# Patient Record
Sex: MALE | Race: WHITE | Employment: OTHER | URBAN - METROPOLITAN AREA
[De-identification: names, ages, dates, MRNs, and addresses within clinical notes are randomized per-mention and may not be internally consistent; named-entity substitution may affect disease eponyms.]

---

## 2021-02-02 ENCOUNTER — OFFICE VISIT (OUTPATIENT)
Dept: ONCOLOGY | Age: 61
End: 2021-02-02
Payer: MEDICARE

## 2021-02-02 ENCOUNTER — HOSPITAL ENCOUNTER (OUTPATIENT)
Dept: INFUSION THERAPY | Age: 61
Discharge: HOME | End: 2021-02-02
Payer: MEDICARE

## 2021-02-02 VITALS
OXYGEN SATURATION: 96 % | HEIGHT: 69 IN | TEMPERATURE: 98.8 F | BODY MASS INDEX: 31.77 KG/M2 | SYSTOLIC BLOOD PRESSURE: 161 MMHG | DIASTOLIC BLOOD PRESSURE: 95 MMHG | WEIGHT: 214.5 LBS | HEART RATE: 97 BPM

## 2021-02-02 DIAGNOSIS — D75.1 ERYTHROCYTOSIS: Primary | ICD-10-CM

## 2021-02-02 PROCEDURE — G8427 DOCREV CUR MEDS BY ELIG CLIN: HCPCS | Performed by: INTERNAL MEDICINE

## 2021-02-02 PROCEDURE — G8484 FLU IMMUNIZE NO ADMIN: HCPCS | Performed by: INTERNAL MEDICINE

## 2021-02-02 PROCEDURE — 4004F PT TOBACCO SCREEN RCVD TLK: CPT | Performed by: INTERNAL MEDICINE

## 2021-02-02 PROCEDURE — G8417 CALC BMI ABV UP PARAM F/U: HCPCS | Performed by: INTERNAL MEDICINE

## 2021-02-02 PROCEDURE — 99205 OFFICE O/P NEW HI 60 MIN: CPT | Performed by: INTERNAL MEDICINE

## 2021-02-02 PROCEDURE — 3017F COLORECTAL CA SCREEN DOC REV: CPT | Performed by: INTERNAL MEDICINE

## 2021-02-02 PROCEDURE — 99214 OFFICE O/P EST MOD 30 MIN: CPT | Performed by: INTERNAL MEDICINE

## 2021-02-02 RX ORDER — TRAMADOL HYDROCHLORIDE 50 MG/1
50 TABLET ORAL EVERY 6 HOURS PRN
COMMUNITY

## 2021-02-02 RX ORDER — PANTOPRAZOLE SODIUM 40 MG/1
40 TABLET, DELAYED RELEASE ORAL DAILY
COMMUNITY

## 2021-02-02 RX ORDER — LISINOPRIL 20 MG/1
20 TABLET ORAL DAILY
COMMUNITY
End: 2022-05-13 | Stop reason: ALTCHOICE

## 2021-02-02 NOTE — PROGRESS NOTES
Problem Relation Age of Onset    No Known Problems Mother     No Known Problems Father        Medications:  Reviewed and reconciled. Social History:  Social History     Socioeconomic History    Marital status: Single     Spouse name: Not on file    Number of children: Not on file    Years of education: Not on file    Highest education level: Not on file   Occupational History    Not on file   Social Needs    Financial resource strain: Not on file    Food insecurity     Worry: Not on file     Inability: Not on file    Transportation needs     Medical: Not on file     Non-medical: Not on file   Tobacco Use    Smoking status: Current Every Day Smoker     Packs/day: 0.50     Years: 40.00     Pack years: 20.00     Types: Cigarettes    Smokeless tobacco: Never Used   Substance and Sexual Activity    Alcohol use:  Yes     Alcohol/week: 6.0 standard drinks     Types: 2 Cans of beer, 2 Shots of liquor, 2 Glasses of wine per week     Comment: weekend    Drug use: Yes     Frequency: 14.0 times per week     Types: Marijuana     Comment: daily    Sexual activity: Not on file   Lifestyle    Physical activity     Days per week: Not on file     Minutes per session: Not on file    Stress: Not on file   Relationships    Social connections     Talks on phone: Not on file     Gets together: Not on file     Attends Buddhism service: Not on file     Active member of club or organization: Not on file     Attends meetings of clubs or organizations: Not on file     Relationship status: Not on file    Intimate partner violence     Fear of current or ex partner: Not on file     Emotionally abused: Not on file     Physically abused: Not on file     Forced sexual activity: Not on file   Other Topics Concern    Not on file   Social History Narrative    Not on file       Allergies:  Not on File    Physical Exam: BP (!) 161/95   Pulse 97   Temp 98.8 °F (37.1 °C)   Ht 5' 9\" (1.753 m)   Wt 214 lb 8 oz (97.3 kg)   SpO2 96%   BMI 31.68 kg/m²   GENERAL: Alert, oriented x 3, not in acute distress. HEENT: PERRLA; EOMI. Oropharynx clear. NECK: Supple. No palpable cervical or supraclavicular lymphadenopathy. LUNGS: Good air entry bilaterally. No wheezing, crackles or rhonchi. CARDIOVASCULAR: Regular rate. No murmurs, rubs or gallops. ABDOMEN: Soft. Non-tender, non-distended. Positive bowel sounds. EXTREMITIES: Without clubbing, cyanosis, or edema. NEUROLOGIC: No focal deficits. ECOG PS 1    Impression/Plan:      The patient is a very pleasant 61-year-old gentleman with a past medical history significant for tobacco use disorder, COPD, hypertension, osteoarthritis and GERD, who has been followed at the [facility] in [facility] for erythrocytosis. The patient had had chronic erythrocytosis, he had a bone marrow biopsy and aspirate done on 7/5/2012, was negative for a primary bone marrow disorder, the erythrocytosis was attributed to lung disease and tobacco abuse, he has been having therapeutic phlebotomies about once a month, for hematocrit greater than 42%. Last phlebotomy was about 2 weeks prior to transferring his care to [facility] ([facility]). 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 I counseled the patient on smoking cessation. We will monitor his CBCD closely, and ordered therapeutic phlebotomies as indicated. The patient will have a CBC done in about 2 to 3 weeks, and possible phlebotomy, await results. RTC in 2 to 3 weeks. Thank you for allowing us to participate in the care of Mrs. Jude Singh.     Beena Guerin MD   HEMATOLOGY/MEDICAL [address] [facility],  [address]  [address] [facility] MED ONCOLOGY  [address] [place] [id number] Dept: [phone]

## 2021-02-02 NOTE — PROGRESS NOTES
Tirso Contreras  1960 64 y.o. Referring Physician: Self referral    PCP: No primary care provider on file. There were no vitals filed for this visit. Wt Readings from Last 3 Encounters:   No data found for Wt        There is no height or weight on file to calculate BMI. Chief Complaint:   Chief Complaint   Patient presents with    New Patient     hematology         Cancer Staging  No matching staging information was found for the patient. Prior Radiation Therapy? NO    Concurrent Chemo/radiation? NO    Prior Chemotherapy? NO    Prior Hormonal Therapy? NO    Head and Neck Cancer? No, patient does NOT have HN cancer.             Current Outpatient Medications:     lisinopril (PRINIVIL;ZESTRIL) 20 MG tablet, Take 20 mg by mouth daily, Disp: , Rfl:     pantoprazole (PROTONIX) 40 MG tablet, Take 40 mg by mouth daily, Disp: , Rfl:     traMADol (ULTRAM) 50 MG tablet, Take 50 mg by mouth every 6 hours as needed for Pain., Disp: , Rfl:        Past Medical History:   Diagnosis Date    GERD (gastroesophageal reflux disease)     Hypertension     Osteoarthritis        Past Surgical History:   Procedure Laterality Date    APPENDECTOMY      TOTAL KNEE ARTHROPLASTY Right 02/01/2018       Family History   Problem Relation Age of Onset    No Known Problems Mother     No Known Problems Father        Social History     Socioeconomic History    Marital status: Single     Spouse name: Not on file    Number of children: Not on file    Years of education: Not on file    Highest education level: Not on file   Occupational History    Not on file   Social Needs    Financial resource strain: Not on file    Food insecurity     Worry: Not on file     Inability: Not on file    Transportation needs     Medical: Not on file     Non-medical: Not on file   Tobacco Use    Smoking status: Current Every Day Smoker     Packs/day: 0.50     Years: 40.00     Pack years: 20.00     Types: Cigarettes  Smokeless tobacco: Never Used   Substance and Sexual Activity    Alcohol use: Yes     Alcohol/week: 6.0 standard drinks     Types: 2 Cans of beer, 2 Shots of liquor, 2 Glasses of wine per week     Comment: weekend    Drug use: Yes     Frequency: 14.0 times per week     Types: Marijuana     Comment: daily    Sexual activity: Not on file   Lifestyle    Physical activity     Days per week: Not on file     Minutes per session: Not on file    Stress: Not on file   Relationships    Social connections     Talks on phone: Not on file     Gets together: Not on file     Attends Rastafari service: Not on file     Active member of club or organization: Not on file     Attends meetings of clubs or organizations: Not on file     Relationship status: Not on file    Intimate partner violence     Fear of current or ex partner: Not on file     Emotionally abused: Not on file     Physically abused: Not on file     Forced sexual activity: Not on file   Other Topics Concern    Not on file   Social History Narrative    Not on file           Occupation: Retired  Retired:  YES: Patient is retired from . REVIEW OF SYSTEMS: <<For Level 5, 10 or more systems>>     Pacemaker/Defibulator/ICD:  No    Mediport: No           FALLS RISK SCREENING ASSESSMENT    Instructions:  Assess the patient and Teller the appropriate indicators that are present for fall risk identification. Total the numbers circled and assign a fall risk score from Table 2.  Reassess patient at a minimum every 12 weeks or with status change. Assessment   Date  2/2/2021     1. Mental Ability: confusion/cognitively impaired No - 0       2. Elimination Issues: incontinence, frequency No - 0       3. Ambulatory: use of assistive devices (walker, cane, off-loading devices), attached to equipment (IV pole, oxygen) No - 0     4. Sensory Limitations: dizziness, vertigo, impaired vision No - 0       5.   Age Less than 72 years - 0 6.  Medication: diuretics, strong analgesics, hypnotics, sedatives, antihypertensive agents   Yes - 3   7. Falls:  recent history of falls within the last 3 months (not to include slipping or tripping)   No - 0   TOTAL 3    If score of 4 or greater was education given? No       TABLE 2   Risk Score Risk Level Plan of Care   0-3 Little or  No Risk 1. Provide assistance as indicated for ambulation activities  2. Reorient confused/cognitively impaired patient  3. Call-light/bell within patient's reach  4. Chair/bed in low position, stretcher/bed with siderails up except when performing patient care activities  5. Educate patient/family/caregiver on falls prevention  6.  Reassess in 12 weeks or with any noted change in patient condition which places them at a risk for a fall   4-6 Moderate Risk 1. Provide assistance as indicated for ambulation activities  2. Reorient confused/cognitively impaired patient  3. Call-light/bell within patient's reach  4. Chair/bed in low position, stretcher/bed with siderails up except when performing patient care activities  5. Educate patient/family/caregiver on falls prevention  6. Falls risk precaution (Yellow sticker Level II) placed on patient chart   7 or   Higher High Risk 1. Place patient in easily observable treatment room  2. Patient attended at all times by family member or staff  3. Provide assistance as indicated for ambulation activities  4. Reorient confused/cognitively impaired patient  5. Call-light/bell within patient's reach  6. Chair/bed in low position, stretcher/bed with siderails up except when performing patient care activities  7. Educate patient/family/caregiver on falls prevention  8.   Falls risk precaution (Yellow sticker Level III) placed on patient chart           MALNUTRITION RISK SCREENING ASSESSMENT Instructions:  Assess the patient and enter the appropriate indicators that are present for nutrition risk identification. Total the numbers entered and assign a risk score. Follow the appropriate action for total score listed below. Assessment   Date  2/2/2021     1. Have you lost weight without trying? 0- No     2. Have you been eating poorly because of a decreased appetite? 0- No   3. Do you have a diagnosis of head and neck cancer?       0- No                                                                                    TOTAL 0        Score of 0-1: No action  Score 2 or greater:  · For Non-Diabetic Patient: Recommend adding Ensure Enlive 2 x daily and provide patient with Ensure wellness bag with coupons  · For Diabetic Patient: Recommend adding Glucerna Shake 2 x daily and provide patient with Glucerna Wellness bag with coupons  · Route to the dietitian via [address]    · Are you having  difficulty performing daily routine tasks  due to fatigue or weakness (ie: bathing/showering, dressing, housework, meal prep, work, child Rebbecca Dashawn): No     · Do you have any arm flexibility/ROM restrictions, swelling or pain that limit activity: No     · Any changes in memory, attention/focus that impact daily activities: No     · Do you avoid participation in leisure/social activity due weakness, fatigue or pain: No     ARE ANY OF THE ABOVE ARE ANSWERED YES: No          PT ASSESSMENT FOR REFERRAL    · Have you had any recent falls in past 2 months: No     · Do you have difficulty  going up/down stairs: No     · Are you having difficulty walking: No     · Do you often hold onto furniture/environmental supports or feel off balance when you are walking: No     · Do you need to take rest breaks when you are walking: No     · Any pain on scale of 1-10 that limits your mobility: No 0/10    ARE ANY OF THE ABOVE ARE ANSWERED YES: No               Ana Winkler

## 2021-02-16 ENCOUNTER — HOSPITAL ENCOUNTER (OUTPATIENT)
Dept: INFUSION THERAPY | Age: 61
End: 2021-02-16

## 2021-02-26 ENCOUNTER — OFFICE VISIT (OUTPATIENT)
Dept: ONCOLOGY | Age: 61
End: 2021-02-26
Payer: MEDICARE

## 2021-02-26 ENCOUNTER — HOSPITAL ENCOUNTER (OUTPATIENT)
Dept: INFUSION THERAPY | Age: 61
Discharge: HOME | End: 2021-02-26
Payer: MEDICARE

## 2021-02-26 VITALS
SYSTOLIC BLOOD PRESSURE: 129 MMHG | WEIGHT: 205.1 LBS | HEIGHT: 70 IN | BODY MASS INDEX: 29.36 KG/M2 | HEART RATE: 86 BPM | DIASTOLIC BLOOD PRESSURE: 84 MMHG | RESPIRATION RATE: 20 BRPM | OXYGEN SATURATION: 99 % | TEMPERATURE: 98.6 F

## 2021-02-26 DIAGNOSIS — D75.1 ERYTHROCYTOSIS: ICD-10-CM

## 2021-02-26 DIAGNOSIS — D75.1 ERYTHROCYTOSIS: Primary | ICD-10-CM

## 2021-02-26 LAB
BASOPHILS ABSOLUTE: 0.07 E9/L (ref 0–0.2)
BASOPHILS RELATIVE PERCENT: 1.5 % (ref 0–2)
EOSINOPHILS ABSOLUTE: 0.16 E9/L (ref 0.05–0.5)
EOSINOPHILS RELATIVE PERCENT: 3.3 % (ref 0–6)
HCT VFR BLD CALC: 45 % (ref 37–54)
HEMOGLOBIN: 14.6 G/DL (ref 12.5–16.5)
IMMATURE GRANULOCYTES #: 0.02 E9/L
IMMATURE GRANULOCYTES %: 0.4 % (ref 0–5)
LYMPHOCYTES ABSOLUTE: 1.48 E9/L (ref 1.5–4)
LYMPHOCYTES RELATIVE PERCENT: 31 % (ref 20–42)
MCH RBC QN AUTO: 26.9 PG (ref 26–35)
MCHC RBC AUTO-ENTMCNC: 32.4 % (ref 32–34.5)
MCV RBC AUTO: 82.9 FL (ref 80–99.9)
MONOCYTES ABSOLUTE: 0.44 E9/L (ref 0.1–0.95)
MONOCYTES RELATIVE PERCENT: 9.2 % (ref 2–12)
NEUTROPHILS ABSOLUTE: 2.61 E9/L (ref 1.8–7.3)
NEUTROPHILS RELATIVE PERCENT: 54.6 % (ref 43–80)
PDW BLD-RTO: 14.7 FL (ref 11.5–15)
PLATELET # BLD: 178 E9/L (ref 130–450)
PMV BLD AUTO: 9.1 FL (ref 7–12)
RBC # BLD: 5.43 E12/L (ref 3.8–5.8)
WBC # BLD: 4.8 E9/L (ref 4.5–11.5)

## 2021-02-26 PROCEDURE — 4004F PT TOBACCO SCREEN RCVD TLK: CPT | Performed by: INTERNAL MEDICINE

## 2021-02-26 PROCEDURE — 81402 MOPATH PROCEDURE LEVEL 3: CPT

## 2021-02-26 PROCEDURE — 82668 ASSAY OF ERYTHROPOIETIN: CPT

## 2021-02-26 PROCEDURE — G8484 FLU IMMUNIZE NO ADMIN: HCPCS | Performed by: INTERNAL MEDICINE

## 2021-02-26 PROCEDURE — G8427 DOCREV CUR MEDS BY ELIG CLIN: HCPCS | Performed by: INTERNAL MEDICINE

## 2021-02-26 PROCEDURE — 85025 COMPLETE CBC W/AUTO DIFF WBC: CPT

## 2021-02-26 PROCEDURE — 3017F COLORECTAL CA SCREEN DOC REV: CPT | Performed by: INTERNAL MEDICINE

## 2021-02-26 PROCEDURE — 99212 OFFICE O/P EST SF 10 MIN: CPT

## 2021-02-26 PROCEDURE — 81219 CALR GENE COM VARIANTS: CPT

## 2021-02-26 PROCEDURE — 99214 OFFICE O/P EST MOD 30 MIN: CPT | Performed by: INTERNAL MEDICINE

## 2021-02-26 PROCEDURE — 81270 JAK2 GENE: CPT

## 2021-02-26 PROCEDURE — G8417 CALC BMI ABV UP PARAM F/U: HCPCS | Performed by: INTERNAL MEDICINE

## 2021-02-26 PROCEDURE — 36415 COLL VENOUS BLD VENIPUNCTURE: CPT

## 2021-02-26 RX ORDER — ASPIRIN 81 MG/1
81 TABLET ORAL DAILY
COMMUNITY

## 2021-02-26 NOTE — PROGRESS NOTES
[address] MED ONCOLOGY  [address] [place] [id number]  Dept: [phone]  Attending progress note      Reason for Visit:   Polycythemia. Referring Physician: Samm Alas MD.    PCP:  No primary care provider on file. History of Present Illness: The patient is a very pleasant 82-year-old gentleman with a past medical history significant for tobacco use disorder, COPD, hypertension, osteoarthritis and GERD, who has been followed at the [facility] in [facility] for erythrocytosis. The patient had had chronic erythrocytosis, he had a bone marrow biopsy and aspirate done on 7/5/2012, was negative for a primary bone marrow disorder, the erythrocytosis was attributed to lung disease and tobacco abuse, he has been having therapeutic phlebotomies about once a month, for hematocrit greater than 42%. Last phlebotomy was about 2 weeks prior to transferring his care to [facility] ([facility] The patient is feeling well in general, no night sweats, unexplained weight loss. Unfortunately continues to smoke cigarettes. Review of Systems;  CONSTITUTIONAL: No fever, chills. Good appetite and energy level. ENMT: Eyes: No diplopia; Nose: No epistaxis. Mouth: No sore throat. RESPIRATORY: No hemoptysis, positive for chronic shortness of breath, cough. CARDIOVASCULAR: No chest pain, palpitations. GASTROINTESTINAL: No nausea/vomiting, abdominal pain, diarrhea/constipation. GENITOURINARY: No dysuria, urinary frequency, hematuria. NEURO: No syncope, presyncope, headache. Remainder:  ROS NEGATIVE    Past Medical History:      Diagnosis Date    GERD (gastroesophageal reflux disease)     Hypertension     Osteoarthritis      There is no problem list on file for this patient.        Past Surgical History:      Procedure Laterality Date    APPENDECTOMY      TOTAL KNEE ARTHROPLASTY Right 02/01/2018       Family History:  Family History   Problem Relation Age of Onset    No Known Problems Mother     No Known Problems Father        Medications:  Reviewed and reconciled. Social History:  Social History     Socioeconomic History    Marital status: Single     Spouse name: Not on file    Number of children: Not on file    Years of education: Not on file    Highest education level: Not on file   Occupational History    Not on file   Social Needs    Financial resource strain: Not on file    Food insecurity     Worry: Not on file     Inability: Not on file    Transportation needs     Medical: Not on file     Non-medical: Not on file   Tobacco Use    Smoking status: Current Every Day Smoker     Packs/day: 0.50     Years: 40.00     Pack years: 20.00     Types: Cigarettes    Smokeless tobacco: Never Used    Tobacco comment: patient states he has slowed down   Substance and Sexual Activity    Alcohol use:  Yes     Alcohol/week: 6.0 standard drinks     Types: 2 Cans of beer, 2 Shots of liquor, 2 Glasses of wine per week     Comment: weekend    Drug use: Yes     Frequency: 14.0 times per week     Types: Marijuana     Comment: daily    Sexual activity: Not on file   Lifestyle    Physical activity     Days per week: Not on file     Minutes per session: Not on file    Stress: Not on file   Relationships    Social connections     Talks on phone: Not on file     Gets together: Not on file     Attends Catholic service: Not on file     Active member of club or organization: Not on file     Attends meetings of clubs or organizations: Not on file     Relationship status: Not on file    Intimate partner violence     Fear of current or ex partner: Not on file     Emotionally abused: Not on file     Physically abused: Not on file     Forced sexual activity: Not on file   Other Topics Concern    Not on file   Social History Narrative    Not on file       Allergies:  No Known Allergies    Physical Exam:  /84 (Site: Left Upper Arm, Position: Sitting, Cuff Size: Medium Adult)   Pulse 86   Temp 98.6 °F (37 °C) (Infrared)   Resp 20   Ht 5' 9.5\" (1.765 m)   Wt 205 lb 1.6 oz (93 kg)   SpO2 99% Comment: room air  BMI 29.85 kg/m²   GENERAL: Alert, oriented x 3, not in acute distress. HEENT: PERRLA; EOMI. Oropharynx clear. NECK: Supple. No palpable cervical or supraclavicular lymphadenopathy. LUNGS: Good air entry bilaterally. No wheezing, crackles or rhonchi. CARDIOVASCULAR: Regular rate. No murmurs, rubs or gallops. ABDOMEN: Soft. Non-tender, non-distended. Positive bowel sounds. EXTREMITIES: Without clubbing, cyanosis, or edema. NEUROLOGIC: No focal deficits. ECOG PS 1    Impression/Plan:      The patient is a very pleasant 45-year-old gentleman with a past medical history significant for tobacco use disorder, COPD, hypertension, osteoarthritis and GERD, who has been followed at the [facility] in [facility] for erythrocytosis. The patient had had chronic erythrocytosis, he had a bone marrow biopsy and aspirate done on 7/5/2012, was negative for a primary bone marrow disorder, the erythrocytosis was attributed to lung disease and tobacco abuse, he has been having therapeutic phlebotomies about once a month, for hematocrit greater than 42%. Last phlebotomy was about 2 weeks prior to transferring his care to [facility] ([facility]). 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 With results. I counseled the patient on smoking cessation. Labs reviewed today, hemoglobin is 14.6, hematocrit 45, MCV 82.9, normal white count and platelet count, no indication for phlebotomy for secondary erythrocytosis. We will monitor his CBCD. RTC in 2 months, with labs, possible phleb. Thank you for allowing us to participate in the care of Mrs. Jude Singh.     Beena Guerin MD   HEMATOLOGY/MEDICAL ONCOLOGY  [facility] [address] MED ONCOLOGY  [address] [facility] [address]  Dept: [phone]

## 2021-02-28 LAB — ERYTHROPOIETIN: 28 MU/ML (ref 4–27)

## 2021-03-15 LAB
Lab: NORMAL
REPORT: NORMAL
THIS TEST SENT TO: NORMAL

## 2021-04-23 ENCOUNTER — OFFICE VISIT (OUTPATIENT)
Dept: ONCOLOGY | Age: 61
End: 2021-04-23
Payer: MEDICARE

## 2021-04-23 ENCOUNTER — HOSPITAL ENCOUNTER (OUTPATIENT)
Dept: INFUSION THERAPY | Age: 61
Discharge: HOME | End: 2021-04-23
Payer: MEDICARE

## 2021-04-23 VITALS
HEIGHT: 69 IN | WEIGHT: 201 LBS | BODY MASS INDEX: 29.77 KG/M2 | HEART RATE: 84 BPM | TEMPERATURE: 96 F | DIASTOLIC BLOOD PRESSURE: 86 MMHG | SYSTOLIC BLOOD PRESSURE: 158 MMHG | OXYGEN SATURATION: 98 %

## 2021-04-23 DIAGNOSIS — D75.1 ERYTHROCYTOSIS: ICD-10-CM

## 2021-04-23 DIAGNOSIS — D75.1 ERYTHROCYTOSIS: Primary | ICD-10-CM

## 2021-04-23 LAB
BASOPHILS ABSOLUTE: 0.06 E9/L (ref 0–0.2)
BASOPHILS RELATIVE PERCENT: 1.2 % (ref 0–2)
EOSINOPHILS ABSOLUTE: 0.16 E9/L (ref 0.05–0.5)
EOSINOPHILS RELATIVE PERCENT: 3.1 % (ref 0–6)
HCT VFR BLD CALC: 47.7 % (ref 37–54)
HEMOGLOBIN: 15 G/DL (ref 12.5–16.5)
IMMATURE GRANULOCYTES #: 0.02 E9/L
IMMATURE GRANULOCYTES %: 0.4 % (ref 0–5)
LYMPHOCYTES ABSOLUTE: 1.39 E9/L (ref 1.5–4)
LYMPHOCYTES RELATIVE PERCENT: 26.8 % (ref 20–42)
MCH RBC QN AUTO: 26.1 PG (ref 26–35)
MCHC RBC AUTO-ENTMCNC: 31.4 % (ref 32–34.5)
MCV RBC AUTO: 83 FL (ref 80–99.9)
MONOCYTES ABSOLUTE: 0.5 E9/L (ref 0.1–0.95)
MONOCYTES RELATIVE PERCENT: 9.7 % (ref 2–12)
NEUTROPHILS ABSOLUTE: 3.05 E9/L (ref 1.8–7.3)
NEUTROPHILS RELATIVE PERCENT: 58.8 % (ref 43–80)
PDW BLD-RTO: 15.2 FL (ref 11.5–15)
PLATELET # BLD: 203 E9/L (ref 130–450)
PMV BLD AUTO: 9.5 FL (ref 7–12)
RBC # BLD: 5.75 E12/L (ref 3.8–5.8)
WBC # BLD: 5.2 E9/L (ref 4.5–11.5)

## 2021-04-23 PROCEDURE — 3017F COLORECTAL CA SCREEN DOC REV: CPT | Performed by: INTERNAL MEDICINE

## 2021-04-23 PROCEDURE — 36415 COLL VENOUS BLD VENIPUNCTURE: CPT

## 2021-04-23 PROCEDURE — G8427 DOCREV CUR MEDS BY ELIG CLIN: HCPCS | Performed by: INTERNAL MEDICINE

## 2021-04-23 PROCEDURE — 99213 OFFICE O/P EST LOW 20 MIN: CPT | Performed by: INTERNAL MEDICINE

## 2021-04-23 PROCEDURE — 99212 OFFICE O/P EST SF 10 MIN: CPT

## 2021-04-23 PROCEDURE — 4004F PT TOBACCO SCREEN RCVD TLK: CPT | Performed by: INTERNAL MEDICINE

## 2021-04-23 PROCEDURE — G8417 CALC BMI ABV UP PARAM F/U: HCPCS | Performed by: INTERNAL MEDICINE

## 2021-04-23 PROCEDURE — 85025 COMPLETE CBC W/AUTO DIFF WBC: CPT

## 2021-04-23 RX ORDER — SILDENAFIL 50 MG/1
50 TABLET, FILM COATED ORAL DAILY
COMMUNITY
Start: 2020-09-25 | End: 2022-05-13 | Stop reason: ALTCHOICE

## 2021-04-23 NOTE — PROGRESS NOTES
[address] MED ONCOLOGY  [address] [facility] [address]  Dept: [phone]  Attending progress note      Reason for Visit:   Polycythemia. Referring Physician: Naina Godinez MD.    PCP:  No primary care provider on file. History of Present Illness: The patient is a very pleasant 51-year-old gentleman with a past medical history significant for tobacco use disorder, COPD, hypertension, osteoarthritis and GERD, who has been [facility] for erythrocytosis. The patient had had chronic erythrocytosis, he had a bone marrow biopsy and aspirate done on 7/5/2012, was negative for a primary bone marrow disorder, the erythrocytosis was attributed to lung disease and tobacco abuse, he has been having therapeutic phlebotomies about once a month, for hematocrit greater than 42%. Last phlebotomy was about 2 weeks prior to transferring his care to [facility] ([facility]). The patient is feeling well in general, no night sweats, unexplained weight loss. Review of Systems;  CONSTITUTIONAL: No fever, chills. Good appetite and energy level. ENMT: Eyes: No diplopia; Nose: No epistaxis. Mouth: No sore throat. RESPIRATORY: No hemoptysis, positive for chronic shortness of breath, cough. CARDIOVASCULAR: No chest pain, palpitations. GASTROINTESTINAL: No nausea/vomiting, abdominal pain, diarrhea/constipation. GENITOURINARY: No dysuria, urinary frequency, hematuria. NEURO: No syncope, presyncope, headache. Remainder:  ROS NEGATIVE    Past Medical History:      Diagnosis Date    GERD (gastroesophageal reflux disease)     Hypertension     Osteoarthritis      There is no problem list on file for this patient.        Past Surgical History:      Procedure Laterality Date    APPENDECTOMY      TOTAL KNEE ARTHROPLASTY Right 02/01/2018       Family History:  Family History   Problem Relation Age of Onset    No Known Problems Mother    [facility] No Known Problems Father        Medications:  Reviewed and reconciled. Social History:  Social History     Socioeconomic History    Marital status: Single     Spouse name: Not on file    Number of children: Not on file    Years of education: Not on file    Highest education level: Not on file   Occupational History    Not on file   Social Needs    Financial resource strain: Not on file    Food insecurity     Worry: Not on file     Inability: Not on file    Transportation needs     Medical: Not on file     Non-medical: Not on file   Tobacco Use    Smoking status: Current Every Day Smoker     Packs/day: 0.50     Years: 40.00     Pack years: 20.00     Types: Cigarettes    Smokeless tobacco: Never Used    Tobacco comment: patient states he has slowed down   Substance and Sexual Activity    Alcohol use:  Yes     Alcohol/week: 6.0 standard drinks     Types: 2 Cans of beer, 2 Shots of liquor, 2 Glasses of wine per week     Comment: weekend    Drug use: Yes     Frequency: 14.0 times per week     Types: Marijuana     Comment: daily    Sexual activity: Not on file   Lifestyle    Physical activity     Days per week: Not on file     Minutes per session: Not on file    Stress: Not on file   Relationships    Social connections     Talks on phone: Not on file     Gets together: Not on file     Attends Episcopalian service: Not on file     Active member of club or organization: Not on file     Attends meetings of clubs or organizations: Not on file     Relationship status: Not on file    Intimate partner violence     Fear of current or ex partner: Not on file     Emotionally abused: Not on file     Physically abused: Not on file     Forced sexual activity: Not on file   Other Topics Concern    Not on file   Social History Narrative    Not on file       Allergies:  No Known Allergies    Physical Exam:  BP (!) 158/86   Pulse 84   Temp 96 °F (35.6 °C)   Ht 5' 9\" (1.753 m)   Wt 201 lb (91.2 kg)   SpO2 98%   BMI 29.68 kg/m²   GENERAL: Alert, oriented x 3, not in acute distress. HEENT: PERRLA; EOMI. Oropharynx clear. NECK: Supple. No palpable cervical or supraclavicular lymphadenopathy. LUNGS: Good air entry bilaterally. No wheezing, crackles or rhonchi. CARDIOVASCULAR: Regular rate. No murmurs, rubs or gallops. ABDOMEN: Soft. Non-tender, non-distended. Positive bowel sounds. EXTREMITIES: Without clubbing, cyanosis, or edema. NEUROLOGIC: No focal deficits. ECOG PS 1    Impression/Plan:      The patient is a very pleasant 53-year-old gentleman with a past medical history significant for tobacco use disorder, COPD, hypertension, osteoarthritis and GERD, who has been followed at the [facility] in [facility] for erythrocytosis. The patient had had chronic erythrocytosis, he had a bone marrow biopsy and aspirate done on 7/5/2012, was negative for a primary bone marrow disorder, the erythrocytosis was attributed to lung disease and tobacco abuse, he has been having therapeutic phlebotomies about once a month, for hematocrit greater than 42%. Last phlebotomy was about 2 weeks prior to transferring his care to [facility] ([facility]). 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 I counseled the patient on smoking cessation. Labs reviewed today, hemoglobin is 15, hematocrit 47.7, normal white count and platelet count, no indication for phlebotomy for secondary erythrocytosis. We will monitor his CBCD. RTC in 3 months, with labs, possible phleb. Thank you for allowing us to participate in the care of Mrs. Christina Molina.     Jaquelin Cardozo MD   HEMATOLOGY/MEDICAL ONCOLOGY  [address] [place] [address] ONCOLOGY  [address] [facility] [address]  Dept: [phone]

## 2021-07-23 ENCOUNTER — OFFICE VISIT (OUTPATIENT)
Dept: ONCOLOGY | Age: 61
End: 2021-07-23
Payer: MEDICARE

## 2021-07-23 ENCOUNTER — HOSPITAL ENCOUNTER (OUTPATIENT)
Dept: INFUSION THERAPY | Age: 61
End: 2021-07-23
Payer: MEDICARE

## 2021-07-23 ENCOUNTER — HOSPITAL ENCOUNTER (OUTPATIENT)
Dept: INFUSION THERAPY | Age: 61
Discharge: HOME | End: 2021-07-23
Payer: MEDICARE

## 2021-07-23 VITALS
OXYGEN SATURATION: 97 % | TEMPERATURE: 97.8 F | SYSTOLIC BLOOD PRESSURE: 162 MMHG | HEIGHT: 69 IN | HEART RATE: 76 BPM | BODY MASS INDEX: 29.18 KG/M2 | WEIGHT: 197 LBS | DIASTOLIC BLOOD PRESSURE: 86 MMHG

## 2021-07-23 DIAGNOSIS — D75.1 ERYTHROCYTOSIS: ICD-10-CM

## 2021-07-23 DIAGNOSIS — D75.1 ERYTHROCYTOSIS: Primary | ICD-10-CM

## 2021-07-23 LAB
BASOPHILS ABSOLUTE: 0.09 E9/L (ref 0–0.2)
BASOPHILS RELATIVE PERCENT: 1.5 % (ref 0–2)
EOSINOPHILS ABSOLUTE: 0.14 E9/L (ref 0.05–0.5)
EOSINOPHILS RELATIVE PERCENT: 2.4 % (ref 0–6)
HCT VFR BLD CALC: 50 % (ref 37–54)
HEMOGLOBIN: 16.4 G/DL (ref 12.5–16.5)
IMMATURE GRANULOCYTES #: 0.03 E9/L
IMMATURE GRANULOCYTES %: 0.5 % (ref 0–5)
LYMPHOCYTES ABSOLUTE: 1.69 E9/L (ref 1.5–4)
LYMPHOCYTES RELATIVE PERCENT: 28.5 % (ref 20–42)
MCH RBC QN AUTO: 28.8 PG (ref 26–35)
MCHC RBC AUTO-ENTMCNC: 32.8 % (ref 32–34.5)
MCV RBC AUTO: 87.7 FL (ref 80–99.9)
MONOCYTES ABSOLUTE: 0.61 E9/L (ref 0.1–0.95)
MONOCYTES RELATIVE PERCENT: 10.3 % (ref 2–12)
NEUTROPHILS ABSOLUTE: 3.37 E9/L (ref 1.8–7.3)
NEUTROPHILS RELATIVE PERCENT: 56.8 % (ref 43–80)
PDW BLD-RTO: 15.7 FL (ref 11.5–15)
PLATELET # BLD: 175 E9/L (ref 130–450)
PMV BLD AUTO: 9.5 FL (ref 7–12)
RBC # BLD: 5.7 E12/L (ref 3.8–5.8)
WBC # BLD: 5.9 E9/L (ref 4.5–11.5)

## 2021-07-23 PROCEDURE — 3017F COLORECTAL CA SCREEN DOC REV: CPT | Performed by: INTERNAL MEDICINE

## 2021-07-23 PROCEDURE — G8427 DOCREV CUR MEDS BY ELIG CLIN: HCPCS | Performed by: INTERNAL MEDICINE

## 2021-07-23 PROCEDURE — 36415 COLL VENOUS BLD VENIPUNCTURE: CPT

## 2021-07-23 PROCEDURE — 99213 OFFICE O/P EST LOW 20 MIN: CPT | Performed by: INTERNAL MEDICINE

## 2021-07-23 PROCEDURE — 99212 OFFICE O/P EST SF 10 MIN: CPT

## 2021-07-23 PROCEDURE — 4004F PT TOBACCO SCREEN RCVD TLK: CPT | Performed by: INTERNAL MEDICINE

## 2021-07-23 PROCEDURE — 85025 COMPLETE CBC W/AUTO DIFF WBC: CPT

## 2021-07-23 PROCEDURE — G8417 CALC BMI ABV UP PARAM F/U: HCPCS | Performed by: INTERNAL MEDICINE

## 2021-07-23 NOTE — PROGRESS NOTES
[address] [facility]OG[facility]  [address] [facility] [address]  Dept: [phone]  Attending progress note      Reason for Visit:   Polycythemia. Referring Physician: Thierno Grimes MD.    PCP:  No primary care provider on file. History of Present Illness: The patient is a very pleasant 24-year-old gentleman with a past medical history significant for tobacco use disorder, COPD, hypertension, osteoarthritis and GERD, who has been followed at the [facility] for erythrocytosis. The patient had had chronic erythrocytosis, he had a bone marrow biopsy and aspirate done on 7/5/2012, was negative for a primary bone marrow disorder, the erythrocytosis was attributed to lung disease and tobacco abuse, he has been having therapeutic phlebotomies about once a month, for hematocrit greater than 42%. Last phlebotomy was about 2 weeks prior to transferring his care to [facility] [place] ([facility]). The patient is feeling well in general, no night sweats, unexplained weight loss. Unfortunately continues to smoke cigarettes, although trying to cut down. Review of Systems;  CONSTITUTIONAL: No fever, chills. Good appetite and energy level. ENMT: Eyes: No diplopia; Nose: No epistaxis. Mouth: No sore throat. RESPIRATORY: No hemoptysis, positive for chronic shortness of breath, cough. CARDIOVASCULAR: No chest pain, palpitations. GASTROINTESTINAL: No nausea/vomiting, abdominal pain, diarrhea/constipation. GENITOURINARY: No dysuria, urinary frequency, hematuria. NEURO: No syncope, presyncope, headache. Remainder:  ROS NEGATIVE    Past Medical History:      Diagnosis Date    GERD (gastroesophageal reflux disease)     Hypertension     Osteoarthritis      There is no problem list on file for this patient.        Past Surgical History:      Procedure Laterality Date    APPENDECTOMY      TOTAL KNEE ARTHROPLASTY Right 02/01/2018       Family History:  Family History   Problem Relation Age of Onset    No Known Problems Mother     No Known Problems Father        Medications:  Reviewed and reconciled. Social History:  Social History     Socioeconomic History    Marital status: Single     Spouse name: Not on file    Number of children: Not on file    Years of education: Not on file    Highest education level: Not on file   Occupational History    Not on file   Tobacco Use    Smoking status: Current Every Day Smoker     Packs/day: 0.50     Years: 40.00     Pack years: 20.00     Types: Cigarettes    Smokeless tobacco: Never Used    Tobacco comment: patient states he has slowed down   Vaping Use    Vaping Use: Never used   Substance and Sexual Activity    Alcohol use: Yes     Alcohol/week: 6.0 standard drinks     Types: 2 Cans of beer, 2 Shots of liquor, 2 Glasses of wine per week     Comment: weekend    Drug use: Yes     Frequency: 14.0 times per week     Types: Marijuana     Comment: daily    Sexual activity: Not on file   Other Topics Concern    Not on file   Social History Narrative    Not on file     Social Determinants of Health     Financial Resource Strain:     Difficulty of Paying Living Expenses:    Food Insecurity:     Worried About Running Out of Food in the Last Year:     Ran Out of Food in the Last Year:    Transportation Needs:     Lack of Transportation (Medical):      Lack of Transportation (Non-Medical):    Physical Activity:     Days of Exercise per Week:     Minutes of Exercise per Session:    Stress:     Feeling of Stress :    Social Connections:     Frequency of Communication with Friends and Family:     Frequency of Social Gatherings with Friends and Family:     Attends Episcopalian Services:     Active Member of Clubs or Organizations:     Attends Club or Organization Meetings:     Marital Status:    Intimate Partner Violence:     Fear of Current or Ex-Partner:     Emotionally Abused:     Physically Abused:     Sexually Abused: Allergies:  No Known Allergies    Physical Exam:  BP (!) 162/86   Pulse 76   Temp 97.8 °F (36.6 °C) (Temporal)   Ht 5' 9\" (1.753 m)   Wt 197 lb (89.4 kg)   SpO2 97%   BMI 29.09 kg/m²   GENERAL: Alert, oriented x 3, not in acute distress. HEENT: PERRLA; EOMI. Oropharynx clear. NECK: Supple. No palpable cervical or supraclavicular lymphadenopathy. LUNGS: Good air entry bilaterally. No wheezing, crackles or rhonchi. CARDIOVASCULAR: Regular rate. No murmurs, rubs or gallops. ABDOMEN: Soft. Non-tender, non-distended. Positive bowel sounds. EXTREMITIES: Without clubbing, cyanosis, or edema. NEUROLOGIC: No focal deficits. ECOG PS 1    Impression/Plan:      The patient is a very pleasant 53-year-old gentleman with a past medical history significant for tobacco use disorder, COPD, hypertension, osteoarthritis and GERD, who has been followed at the [facility] in [address] for erythrocytosis. The patient had had chronic erythrocytosis, he had a bone marrow biopsy and aspirate done on 7/5/2012, was negative for a primary bone marrow disorder, the erythrocytosis was attributed to lung disease and tobacco abuse, he has been having therapeutic phlebotomies about once a month, for hematocrit greater than 42%. Last phlebotomy was about 2 weeks prior to transferring his care [facility] 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 I counseled the patient on smoking cessation. Labs reviewed today, hemoglobin is one 6.4, hematocrit 50, normal white count and platelet count, no indication for phlebotomy for secondary erythrocytosis. We will monitor his CBCD. RTC in 3 months, with labs, possible phleb. Thank you for allowing us to participate in the care of Mrs. Gray Hardwick.     Mendoza Graves MD   HEMATOLOGY/MEDICAL [address] [facility]  [address] [facility] [address]  Dept: [phone]

## 2021-10-22 ENCOUNTER — OFFICE VISIT (OUTPATIENT)
Dept: ONCOLOGY | Age: 61
End: 2021-10-22
Payer: MEDICARE

## 2021-10-22 ENCOUNTER — HOSPITAL ENCOUNTER (OUTPATIENT)
Dept: INFUSION THERAPY | Age: 61
Discharge: HOME | End: 2021-10-22
Payer: MEDICARE

## 2021-10-22 VITALS
BODY MASS INDEX: 30.36 KG/M2 | HEIGHT: 69 IN | TEMPERATURE: 98.1 F | WEIGHT: 205 LBS | RESPIRATION RATE: 18 BRPM | HEART RATE: 81 BPM | OXYGEN SATURATION: 96 % | DIASTOLIC BLOOD PRESSURE: 96 MMHG | SYSTOLIC BLOOD PRESSURE: 124 MMHG

## 2021-10-22 DIAGNOSIS — D75.1 ERYTHROCYTOSIS: Primary | ICD-10-CM

## 2021-10-22 LAB
BASOPHILS ABSOLUTE: 0.09 E9/L (ref 0–0.2)
BASOPHILS RELATIVE PERCENT: 1.4 % (ref 0–2)
EOSINOPHILS ABSOLUTE: 0.18 E9/L (ref 0.05–0.5)
EOSINOPHILS RELATIVE PERCENT: 2.9 % (ref 0–6)
HCT VFR BLD CALC: 49.5 % (ref 37–54)
HEMOGLOBIN: 17 G/DL (ref 12.5–16.5)
IMMATURE GRANULOCYTES #: 0.03 E9/L
IMMATURE GRANULOCYTES %: 0.5 % (ref 0–5)
LYMPHOCYTES ABSOLUTE: 1.63 E9/L (ref 1.5–4)
LYMPHOCYTES RELATIVE PERCENT: 26.1 % (ref 20–42)
MCH RBC QN AUTO: 30 PG (ref 26–35)
MCHC RBC AUTO-ENTMCNC: 34.3 % (ref 32–34.5)
MCV RBC AUTO: 87.5 FL (ref 80–99.9)
MONOCYTES ABSOLUTE: 0.64 E9/L (ref 0.1–0.95)
MONOCYTES RELATIVE PERCENT: 10.3 % (ref 2–12)
NEUTROPHILS ABSOLUTE: 3.67 E9/L (ref 1.8–7.3)
NEUTROPHILS RELATIVE PERCENT: 58.8 % (ref 43–80)
PDW BLD-RTO: 14.3 FL (ref 11.5–15)
PLATELET # BLD: 171 E9/L (ref 130–450)
PMV BLD AUTO: 9.5 FL (ref 7–12)
RBC # BLD: 5.66 E12/L (ref 3.8–5.8)
WBC # BLD: 6.2 E9/L (ref 4.5–11.5)

## 2021-10-22 PROCEDURE — G8427 DOCREV CUR MEDS BY ELIG CLIN: HCPCS | Performed by: INTERNAL MEDICINE

## 2021-10-22 PROCEDURE — G8417 CALC BMI ABV UP PARAM F/U: HCPCS | Performed by: INTERNAL MEDICINE

## 2021-10-22 PROCEDURE — 99212 OFFICE O/P EST SF 10 MIN: CPT

## 2021-10-22 PROCEDURE — 3017F COLORECTAL CA SCREEN DOC REV: CPT | Performed by: INTERNAL MEDICINE

## 2021-10-22 PROCEDURE — 85025 COMPLETE CBC W/AUTO DIFF WBC: CPT

## 2021-10-22 PROCEDURE — 36415 COLL VENOUS BLD VENIPUNCTURE: CPT

## 2021-10-22 PROCEDURE — G8484 FLU IMMUNIZE NO ADMIN: HCPCS | Performed by: INTERNAL MEDICINE

## 2021-10-22 PROCEDURE — 99213 OFFICE O/P EST LOW 20 MIN: CPT | Performed by: INTERNAL MEDICINE

## 2021-10-22 PROCEDURE — 4004F PT TOBACCO SCREEN RCVD TLK: CPT | Performed by: INTERNAL MEDICINE

## 2021-10-22 RX ORDER — LISINOPRIL 10 MG/1
TABLET ORAL
COMMUNITY
Start: 2021-08-25

## 2021-10-22 NOTE — PROGRESS NOTES
[address] [facility]  [address] [place] [id number]  Dept: [phone]  Attending progress note      Reason for Visit:   Polycythemia. Referring Physician: Luis Skiff, MD.    PCP:  No primary care provider on file. History of Present Illness: The patient is a very pleasant 61-year-old gentleman with a past medical history significant for tobacco use disorder, COPD, hypertension, osteoarthritis and GERD, who has been followed at the [facility] in [facility] for erythrocytosis. The patient had had chronic erythrocytosis, he had a bone marrow biopsy and aspirate done on 7/5/2012, was negative for a primary bone marrow disorder, the erythrocytosis was attributed to lung disease and tobacco abuse, he has been having therapeutic phlebotomies about once a month, for hematocrit greater than 42%. Last phlebotomy was about 2 weeks prior to transferring his care to [facility] ([facility]). The patient is feeling well in general, no night sweats, unexplained weight loss. Unfortunately continues to smoke cigarettes, although trying to cut down. No new problems. Review of Systems;  CONSTITUTIONAL: No fever, chills. Good appetite and energy level. ENMT: Eyes: No diplopia; Nose: No epistaxis. Mouth: No sore throat. RESPIRATORY: No hemoptysis, positive for chronic shortness of breath, cough. CARDIOVASCULAR: No chest pain, palpitations. GASTROINTESTINAL: No nausea/vomiting, abdominal pain, diarrhea/constipation. GENITOURINARY: No dysuria, urinary frequency, hematuria. NEURO: No syncope, presyncope, headache. Remainder:  ROS NEGATIVE    Past Medical History:      Diagnosis Date    GERD (gastroesophageal reflux disease)     Hypertension     Osteoarthritis      There is no problem list on file for this patient.        Past Surgical History:      Procedure Laterality Date    APPENDECTOMY      TOTAL KNEE ARTHROPLASTY Right 02/01/2018 Family History:  Family History   Problem Relation Age of Onset    No Known Problems Mother     No Known Problems Father        Medications:  Reviewed and reconciled. Social History:  Social History     Socioeconomic History    Marital status: Single     Spouse name: Not on file    Number of children: Not on file    Years of education: Not on file    Highest education level: Not on file   Occupational History    Not on file   Tobacco Use    Smoking status: Current Every Day Smoker     Packs/day: 0.50     Years: 40.00     Pack years: 20.00     Types: Cigarettes    Smokeless tobacco: Never Used    Tobacco comment: patient states he has slowed down   Vaping Use    Vaping Use: Never used   Substance and Sexual Activity    Alcohol use: Yes     Alcohol/week: 6.0 standard drinks     Types: 2 Cans of beer, 2 Shots of liquor, 2 Glasses of wine per week     Comment: weekend    Drug use: Yes     Frequency: 14.0 times per week     Types: Marijuana     Comment: daily    Sexual activity: Not on file   Other Topics Concern    Not on file   Social History Narrative    Not on file     Social Determinants of Health     Financial Resource Strain:     Difficulty of Paying Living Expenses:    Food Insecurity:     Worried About Running Out of Food in the Last Year:     Ran Out of Food in the Last Year:    Transportation Needs:     Lack of Transportation (Medical):      Lack of Transportation (Non-Medical):    Physical Activity:     Days of Exercise per Week:     Minutes of Exercise per Session:    Stress:     Feeling of Stress :    Social Connections:     Frequency of Communication with Friends and Family:     Frequency of Social Gatherings with Friends and Family:     Attends Hinduism Services:     Active Member of Clubs or Organizations:     Attends Club or Organization Meetings:     Marital Status:    Intimate Partner Violence:     Fear of Current or Ex-Partner:     Emotionally Abused:     Physically Abused:     Sexually Abused: Allergies:  No Known Allergies    Physical Exam:  BP (!) 124/96   Pulse 81   Temp 98.1 °F (36.7 °C)   Resp 18   Ht 5' 9\" (1.753 m)   Wt 205 lb (93 kg)   SpO2 96%   BMI 30.27 kg/m²   GENERAL: Alert, oriented x 3, not in acute distress. HEENT: PERRLA; EOMI. Oropharynx clear. NECK: Supple. No palpable cervical or supraclavicular lymphadenopathy. LUNGS: Good air entry bilaterally. No wheezing, crackles or rhonchi. CARDIOVASCULAR: Regular rate. No murmurs, rubs or gallops. ABDOMEN: Soft. Non-tender, non-distended. Positive bowel sounds. EXTREMITIES: Without clubbing, cyanosis, or edema. NEUROLOGIC: No focal deficits. ECOG PS 1    Impression/Plan:      The patient is a very pleasant 24-year-old gentleman with a past medical history significant for tobacco use disorder, COPD, hypertension, osteoarthritis and GERD, who has been followed at the [facility] [place], [facility] in [address] for erythrocytosis. The patient had had chronic erythrocytosis, he had a bone marrow biopsy and aspirate done on 7/5/2012, was negative for a primary bone marrow disorder, the erythrocytosis was attributed to lung disease and tobacco abuse, he has been having therapeutic phlebotomies about once a month, for hematocrit greater than 42%. Last phlebotomy was about 2 weeks prior to transferring his care to [facility] ([facility]). 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 I counseled the patient on smoking cessation. Labs reviewed today, hemoglobin is 17 G/DL, hematocrit 49.5, normal white count and platelet count, no indication for phlebotomy for secondary erythrocytosis. We will monitor his CBCD. RTC in 3 months, with labs, possible phleb. Thank you for allowing us to participate in the care of Mrs. Fercho Higuera.     MITCH GALLEGOS, MD   HEMATOLOGY/MEDICAL ONCOLOGY  [address] [facility]  [facility] [address]  Dept: [phone]

## 2022-01-21 ENCOUNTER — OFFICE VISIT (OUTPATIENT)
Dept: ONCOLOGY | Age: 62
End: 2022-01-21
Payer: MEDICARE

## 2022-01-21 ENCOUNTER — HOSPITAL ENCOUNTER (OUTPATIENT)
Dept: INFUSION THERAPY | Age: 62
Discharge: HOME | End: 2022-01-21
Payer: MEDICARE

## 2022-01-21 ENCOUNTER — HOSPITAL ENCOUNTER (OUTPATIENT)
Age: 62
Discharge: HOME | End: 2022-01-21
Payer: MEDICARE

## 2022-01-21 VITALS — HEART RATE: 89 BPM | SYSTOLIC BLOOD PRESSURE: 138 MMHG | DIASTOLIC BLOOD PRESSURE: 88 MMHG | RESPIRATION RATE: 16 BRPM

## 2022-01-21 VITALS
BODY MASS INDEX: 30.57 KG/M2 | HEART RATE: 90 BPM | SYSTOLIC BLOOD PRESSURE: 145 MMHG | TEMPERATURE: 97.7 F | OXYGEN SATURATION: 96 % | DIASTOLIC BLOOD PRESSURE: 86 MMHG | WEIGHT: 201.7 LBS | HEIGHT: 68 IN

## 2022-01-21 DIAGNOSIS — F17.200 SMOKER: ICD-10-CM

## 2022-01-21 DIAGNOSIS — D75.1 ERYTHROCYTOSIS: Primary | ICD-10-CM

## 2022-01-21 LAB
ALBUMIN SERPL-MCNC: 4.7 G/DL (ref 3.5–5.2)
ALP BLD-CCNC: 74 U/L (ref 40–129)
ALT SERPL-CCNC: 32 U/L (ref 0–40)
ANION GAP SERPL CALCULATED.3IONS-SCNC: 17 MMOL/L (ref 7–16)
AST SERPL-CCNC: 21 U/L (ref 0–39)
BASOPHILS ABSOLUTE: 0.07 E9/L (ref 0–0.2)
BASOPHILS ABSOLUTE: 0.08 E9/L (ref 0–0.2)
BASOPHILS RELATIVE PERCENT: 1.1 % (ref 0–2)
BASOPHILS RELATIVE PERCENT: 1.3 % (ref 0–2)
BILIRUB SERPL-MCNC: 0.8 MG/DL (ref 0–1.2)
BUN BLDV-MCNC: 9 MG/DL (ref 6–20)
CALCIUM SERPL-MCNC: 9.8 MG/DL (ref 8.6–10.2)
CHLORIDE BLD-SCNC: 95 MMOL/L (ref 98–107)
CHOLESTEROL, TOTAL: 221 MG/DL (ref 0–199)
CO2: 23 MMOL/L (ref 22–29)
CREAT SERPL-MCNC: 0.8 MG/DL (ref 0.7–1.2)
EOSINOPHILS ABSOLUTE: 0.14 E9/L (ref 0.05–0.5)
EOSINOPHILS ABSOLUTE: 0.16 E9/L (ref 0.05–0.5)
EOSINOPHILS RELATIVE PERCENT: 2 % (ref 0–6)
EOSINOPHILS RELATIVE PERCENT: 3 % (ref 0–6)
GFR AFRICAN AMERICAN: >60
GFR NON-AFRICAN AMERICAN: >60 ML/MIN/1.73
GLUCOSE BLD-MCNC: 206 MG/DL (ref 74–99)
HBA1C MFR BLD: 8.7 % (ref 4–5.6)
HCT VFR BLD CALC: 51.9 % (ref 37–54)
HCT VFR BLD CALC: 52.1 % (ref 37–54)
HDLC SERPL-MCNC: 35 MG/DL
HEMOGLOBIN: 18.2 G/DL (ref 12.5–16.5)
HEMOGLOBIN: 18.2 G/DL (ref 12.5–16.5)
IMMATURE GRANULOCYTES #: 0.03 E9/L
IMMATURE GRANULOCYTES #: 0.03 E9/L
IMMATURE GRANULOCYTES %: 0.4 % (ref 0–5)
IMMATURE GRANULOCYTES %: 0.6 % (ref 0–5)
LDL CHOLESTEROL CALCULATED: 137 MG/DL (ref 0–99)
LYMPHOCYTES ABSOLUTE: 1.75 E9/L (ref 1.5–4)
LYMPHOCYTES ABSOLUTE: 1.88 E9/L (ref 1.5–4)
LYMPHOCYTES RELATIVE PERCENT: 26.5 % (ref 20–42)
LYMPHOCYTES RELATIVE PERCENT: 32.3 % (ref 20–42)
MCH RBC QN AUTO: 32 PG (ref 26–35)
MCH RBC QN AUTO: 32.2 PG (ref 26–35)
MCHC RBC AUTO-ENTMCNC: 34.9 % (ref 32–34.5)
MCHC RBC AUTO-ENTMCNC: 35.1 % (ref 32–34.5)
MCV RBC AUTO: 91.6 FL (ref 80–99.9)
MCV RBC AUTO: 91.7 FL (ref 80–99.9)
MONOCYTES ABSOLUTE: 0.5 E9/L (ref 0.1–0.95)
MONOCYTES ABSOLUTE: 0.6 E9/L (ref 0.1–0.95)
MONOCYTES RELATIVE PERCENT: 8.5 % (ref 2–12)
MONOCYTES RELATIVE PERCENT: 9.2 % (ref 2–12)
NEUTROPHILS ABSOLUTE: 2.9 E9/L (ref 1.8–7.3)
NEUTROPHILS ABSOLUTE: 4.37 E9/L (ref 1.8–7.3)
NEUTROPHILS RELATIVE PERCENT: 53.6 % (ref 43–80)
NEUTROPHILS RELATIVE PERCENT: 61.5 % (ref 43–80)
PDW BLD-RTO: 13.1 FL (ref 11.5–15)
PDW BLD-RTO: 13.2 FL (ref 11.5–15)
PLATELET # BLD: 155 E9/L (ref 130–450)
PLATELET # BLD: 181 E9/L (ref 130–450)
PMV BLD AUTO: 9.1 FL (ref 7–12)
PMV BLD AUTO: 9.3 FL (ref 7–12)
POTASSIUM SERPL-SCNC: 3.9 MMOL/L (ref 3.5–5)
RBC # BLD: 5.66 E12/L (ref 3.8–5.8)
RBC # BLD: 5.69 E12/L (ref 3.8–5.8)
SODIUM BLD-SCNC: 135 MMOL/L (ref 132–146)
TOTAL PROTEIN: 7.9 G/DL (ref 6.4–8.3)
TRIGL SERPL-MCNC: 246 MG/DL (ref 0–149)
VLDLC SERPL CALC-MCNC: 49 MG/DL
WBC # BLD: 5.4 E9/L (ref 4.5–11.5)
WBC # BLD: 7.1 E9/L (ref 4.5–11.5)

## 2022-01-21 PROCEDURE — G8427 DOCREV CUR MEDS BY ELIG CLIN: HCPCS | Performed by: INTERNAL MEDICINE

## 2022-01-21 PROCEDURE — 85025 COMPLETE CBC W/AUTO DIFF WBC: CPT

## 2022-01-21 PROCEDURE — 80053 COMPREHEN METABOLIC PANEL: CPT

## 2022-01-21 PROCEDURE — 36415 COLL VENOUS BLD VENIPUNCTURE: CPT

## 2022-01-21 PROCEDURE — G8484 FLU IMMUNIZE NO ADMIN: HCPCS | Performed by: INTERNAL MEDICINE

## 2022-01-21 PROCEDURE — 3017F COLORECTAL CA SCREEN DOC REV: CPT | Performed by: INTERNAL MEDICINE

## 2022-01-21 PROCEDURE — 83036 HEMOGLOBIN GLYCOSYLATED A1C: CPT

## 2022-01-21 PROCEDURE — G8417 CALC BMI ABV UP PARAM F/U: HCPCS | Performed by: INTERNAL MEDICINE

## 2022-01-21 PROCEDURE — 99195 PHLEBOTOMY: CPT

## 2022-01-21 PROCEDURE — 83721 ASSAY OF BLOOD LIPOPROTEIN: CPT

## 2022-01-21 PROCEDURE — 80061 LIPID PANEL: CPT

## 2022-01-21 PROCEDURE — 99214 OFFICE O/P EST MOD 30 MIN: CPT | Performed by: INTERNAL MEDICINE

## 2022-01-21 PROCEDURE — 4004F PT TOBACCO SCREEN RCVD TLK: CPT | Performed by: INTERNAL MEDICINE

## 2022-01-21 NOTE — PROGRESS NOTES
ARTHROPLASTY Right 02/01/2018       Family History:  Family History   Problem Relation Age of Onset    No Known Problems Mother     No Known Problems Father        Medications:  Reviewed and reconciled. Social History:  Social History     Socioeconomic History    Marital status: Single     Spouse name: Not on file    Number of children: Not on file    Years of education: Not on file    Highest education level: Not on file   Occupational History    Not on file   Tobacco Use    Smoking status: Current Every Day Smoker     Packs/day: 0.50     Years: 40.00     Pack years: 20.00     Types: Cigarettes    Smokeless tobacco: Never Used    Tobacco comment: patient states he has slowed down   Vaping Use    Vaping Use: Never used   Substance and Sexual Activity    Alcohol use: Yes     Alcohol/week: 6.0 standard drinks     Types: 2 Cans of beer, 2 Shots of liquor, 2 Glasses of wine per week     Comment: weekend    Drug use: Yes     Frequency: 14.0 times per week     Types: Marijuana Veldon Bunting)     Comment: daily    Sexual activity: Not on file   Other Topics Concern    Not on file   Social History Narrative    Not on file     Social Determinants of Health     Financial Resource Strain:     Difficulty of Paying Living Expenses: Not on file   Food Insecurity:     Worried About Running Out of Food in the Last Year: Not on file    Ayden of Food in the Last Year: Not on file   Transportation Needs:     Lack of Transportation (Medical): Not on file    Lack of Transportation (Non-Medical):  Not on file   Physical Activity:     Days of Exercise per Week: Not on file    Minutes of Exercise per Session: Not on file   Stress:     Feeling of Stress : Not on file   Social Connections:     Frequency of Communication with Friends and Family: Not on file    Frequency of Social Gatherings with Friends and Family: Not on file    Attends Protestant Services: Not on file    Active Member of Clubs or Organizations: Not on file    Attends Club or Organization Meetings: Not on file    Marital Status: Not on file   Intimate Partner Violence:     Fear of Current or Ex-Partner: Not on file    Emotionally Abused: Not on file    Physically Abused: Not on file    Sexually Abused: Not on file   Housing Stability:     Unable to Pay for Housing in the Last Year: Not on file    Number of [place] in the Last Year: Not on file    Unstable Housing in the Last Year: Not on file       Allergies:  No Known Allergies    Physical Exam:  BP (!) 145/86   Pulse 90   Temp 97.7 °F (36.5 °C)   Ht 5' 8\" (1.727 m)   Wt 201 lb 11.2 oz (91.5 kg)   SpO2 96%   BMI 30.67 kg/m²   GENERAL: Alert, oriented x 3, not in acute distress. HEENT: PERRLA; EOMI. Oropharynx clear. NECK: Supple. No palpable cervical or supraclavicular lymphadenopathy. LUNGS: Good air entry bilaterally. No wheezing, crackles or rhonchi. CARDIOVASCULAR: Regular rate. No murmurs, rubs or gallops. ABDOMEN: Soft. Non-tender, non-distended. Positive bowel sounds. EXTREMITIES: Without clubbing, cyanosis, or edema. NEUROLOGIC: No focal deficits. ECOG PS 1    Impression/Plan:      The patient is a very pleasant 57-year-old gentleman with a past medical history significant for tobacco use disorder, COPD, hypertension, osteoarthritis and GERD, who has been followed at the [facility] in [address] for erythrocytosis. The patient had had chronic erythrocytosis, he had a bone marrow biopsy and aspirate done on 7/5/2012, was negative for a primary bone marrow disorder, the erythrocytosis was attributed to lung disease and tobacco abuse, he has been having therapeutic phlebotomies about once a month, for hematocrit greater than 42%. Last phlebotomy was about 2 weeks prior to transferring his care to [facility]).      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 I counseled the patient on smoking cessation. Low-dose CT lung screen will be ordered. Labs reviewed today, hemoglobin is 18.2 G/DL, hematocrit 52.1, normal white count and platelet count, the patient will have a phlebotomy done today, 350 cc of whole blood will be removed, the patient will avoid vigorous exercise and will keep himself well-hydrated after the phlebotomy. We will monitor his CBCD. RTC in 3 months, with labs, possible phleb. Thank you for allowing us to participate in the care of Mrs. Laura Hampton.     Luis Alfredo Hogan MD   HEMATOLOGY/MEDICAL [address] [address] [address] [facility]COL[facility]  [address] [place] [id number]  Dept: [address]: [phone]

## 2022-01-25 LAB
Lab: NORMAL
REPORT: NORMAL
THIS TEST SENT TO: NORMAL

## 2022-02-28 ENCOUNTER — TELEPHONE (OUTPATIENT)
Dept: CASE MANAGEMENT | Age: 62
End: 2022-02-28

## 2022-02-28 NOTE — TELEPHONE ENCOUNTER
I called the patient and he confirmed his CT lung screening at [address] [place] [address] on 3/1/2022 at 12:30 pm.  I reminded the patient to arrive at 12:00 pm, enter through the main entrance, and register. Patient confirmed.             Electronically signed by Julian Mcguire on 2/28/22 at 3:42 PM EST

## 2022-03-01 ENCOUNTER — HOSPITAL ENCOUNTER (OUTPATIENT)
Dept: CT IMAGING | Age: 62
Discharge: HOME | End: 2022-03-03
Payer: MEDICARE

## 2022-03-01 DIAGNOSIS — F17.200 SMOKER: ICD-10-CM

## 2022-03-01 DIAGNOSIS — D75.1 ERYTHROCYTOSIS: ICD-10-CM

## 2022-03-01 PROCEDURE — 71271 CT THORAX LUNG CANCER SCR C-: CPT

## 2022-03-02 ENCOUNTER — TELEPHONE (OUTPATIENT)
Dept: CASE MANAGEMENT | Age: 62
End: 2022-03-02

## 2022-03-02 NOTE — TELEPHONE ENCOUNTER
No call, encounter opened to process CT Lung Screening. CT Lung Screen: 3/1/2022      Impression   1. There is no pulmonary infiltrate, mass or suspicious pulmonary nodule   2. Emphysematous changes   3. Hepatic steatosis.       LUNG RADS:   Per ACR Lung-RADS Version 1.1       Category 1, Negative (No nodules and definitely benign nodules).       Management: Continue annual lung screening with LDCT in 12 months.       RECOMMENDATIONS:   If you would like to register your patient with the [facility], please contact the Nurse Navigator at   [phone]. Pack years: 20    Social History     Tobacco Use  Smoking Status: Current Every Day Smoker    Start Date:    Quit Date:    Types: Cigarettes   Packs/Day: 0.5   Years: 40   Pack Years: 20   Smokeless Tobacco: Never Used         Results letter sent to patient via my chart or mailed.      [facility]

## 2022-05-11 DIAGNOSIS — D75.1 ERYTHROCYTOSIS: Primary | ICD-10-CM

## 2022-05-13 ENCOUNTER — OFFICE VISIT (OUTPATIENT)
Dept: ONCOLOGY | Age: 62
End: 2022-05-13
Payer: MEDICARE

## 2022-05-13 ENCOUNTER — HOSPITAL ENCOUNTER (OUTPATIENT)
Age: 62
Discharge: HOME | End: 2022-05-15
Payer: MEDICARE

## 2022-05-13 ENCOUNTER — HOSPITAL ENCOUNTER (OUTPATIENT)
Dept: INFUSION THERAPY | Age: 62
Discharge: HOME | End: 2022-05-13
Payer: MEDICARE

## 2022-05-13 ENCOUNTER — HOSPITAL ENCOUNTER (OUTPATIENT)
Dept: GENERAL RADIOLOGY | Age: 62
Discharge: HOME | End: 2022-05-15
Payer: MEDICARE

## 2022-05-13 VITALS
TEMPERATURE: 98.2 F | DIASTOLIC BLOOD PRESSURE: 83 MMHG | SYSTOLIC BLOOD PRESSURE: 150 MMHG | HEART RATE: 78 BPM | RESPIRATION RATE: 18 BRPM

## 2022-05-13 VITALS
OXYGEN SATURATION: 99 % | SYSTOLIC BLOOD PRESSURE: 157 MMHG | TEMPERATURE: 98.7 F | HEIGHT: 69 IN | RESPIRATION RATE: 20 BRPM | DIASTOLIC BLOOD PRESSURE: 87 MMHG | BODY MASS INDEX: 29.09 KG/M2 | HEART RATE: 77 BPM | WEIGHT: 196.4 LBS

## 2022-05-13 DIAGNOSIS — D75.1 ERYTHROCYTOSIS: Primary | ICD-10-CM

## 2022-05-13 DIAGNOSIS — M25.561 RIGHT KNEE PAIN, UNSPECIFIED CHRONICITY: ICD-10-CM

## 2022-05-13 LAB
BASOPHILS ABSOLUTE: 0.07 E9/L (ref 0–0.2)
BASOPHILS RELATIVE PERCENT: 1.2 % (ref 0–2)
EOSINOPHILS ABSOLUTE: 0.11 E9/L (ref 0.05–0.5)
EOSINOPHILS RELATIVE PERCENT: 1.9 % (ref 0–6)
HCT VFR BLD CALC: 53.2 % (ref 37–54)
HEMOGLOBIN: 18.4 G/DL (ref 12.5–16.5)
IMMATURE GRANULOCYTES #: 0.02 E9/L
IMMATURE GRANULOCYTES %: 0.4 % (ref 0–5)
LYMPHOCYTES ABSOLUTE: 1.48 E9/L (ref 1.5–4)
LYMPHOCYTES RELATIVE PERCENT: 26 % (ref 20–42)
MCH RBC QN AUTO: 31.6 PG (ref 26–35)
MCHC RBC AUTO-ENTMCNC: 34.6 % (ref 32–34.5)
MCV RBC AUTO: 91.4 FL (ref 80–99.9)
MONOCYTES ABSOLUTE: 0.63 E9/L (ref 0.1–0.95)
MONOCYTES RELATIVE PERCENT: 11.1 % (ref 2–12)
NEUTROPHILS ABSOLUTE: 3.38 E9/L (ref 1.8–7.3)
NEUTROPHILS RELATIVE PERCENT: 59.4 % (ref 43–80)
PDW BLD-RTO: 13.2 FL (ref 11.5–15)
PLATELET # BLD: 151 E9/L (ref 130–450)
PMV BLD AUTO: 8.8 FL (ref 7–12)
RBC # BLD: 5.82 E12/L (ref 3.8–5.8)
WBC # BLD: 5.7 E9/L (ref 4.5–11.5)

## 2022-05-13 PROCEDURE — 73562 X-RAY EXAM OF KNEE 3: CPT

## 2022-05-13 PROCEDURE — 85025 COMPLETE CBC W/AUTO DIFF WBC: CPT

## 2022-05-13 PROCEDURE — G8417 CALC BMI ABV UP PARAM F/U: HCPCS | Performed by: INTERNAL MEDICINE

## 2022-05-13 PROCEDURE — 99214 OFFICE O/P EST MOD 30 MIN: CPT | Performed by: INTERNAL MEDICINE

## 2022-05-13 PROCEDURE — 2580000003 HC RX 258: Performed by: INTERNAL MEDICINE

## 2022-05-13 PROCEDURE — 36415 COLL VENOUS BLD VENIPUNCTURE: CPT

## 2022-05-13 PROCEDURE — 4004F PT TOBACCO SCREEN RCVD TLK: CPT | Performed by: INTERNAL MEDICINE

## 2022-05-13 PROCEDURE — G8427 DOCREV CUR MEDS BY ELIG CLIN: HCPCS | Performed by: INTERNAL MEDICINE

## 2022-05-13 PROCEDURE — 99195 PHLEBOTOMY: CPT

## 2022-05-13 PROCEDURE — 3017F COLORECTAL CA SCREEN DOC REV: CPT | Performed by: INTERNAL MEDICINE

## 2022-05-13 RX ORDER — 0.9 % SODIUM CHLORIDE 0.9 %
350 INTRAVENOUS SOLUTION INTRAVENOUS ONCE
Status: COMPLETED | OUTPATIENT
Start: 2022-05-13 | End: 2022-05-13

## 2022-05-13 RX ADMIN — SODIUM CHLORIDE 350 ML: 9 INJECTION, SOLUTION INTRAVENOUS at 12:07

## 2022-05-13 NOTE — PROGRESS NOTES
[address]  [address]  MED ONCOLOGY  [facility] [address]  Dept: 71 Tamara Roque: [phone]  Attending progress note      Reason for Visit:   Polycythemia. Referring Physician: Rubio Valerio MD.    PCP:  Connor Roldan    History of Present Illness: The patient is a very pleasant 81-year-old gentleman with a past medical history significant for tobacco use disorder, COPD, hypertension, osteoarthritis and GERD, who has been followed at the [facility] in [facility] for erythrocytosis. The patient had had chronic erythrocytosis, he had a bone marrow biopsy and aspirate done on 7/5/2012, was negative for a primary bone marrow disorder, the erythrocytosis was attributed to lung disease and tobacco abuse, he has been having therapeutic phlebotomies about once a month, for hematocrit greater than 42%. Last phlebotomy was about 2 weeks prior to transferring his care to [facility] ([facility]). The patient has right knee pain, PCP had ordered an x-ray, he had knee replacement done when he was in [place]. No night sweats, unexplained weight loss. Unfortunately continues to smoke cigarettes. Review of Systems;  CONSTITUTIONAL: No fever, chills. Good appetite and energy level. ENMT: Eyes: No diplopia; Nose: No epistaxis. Mouth: No sore throat. RESPIRATORY: No hemoptysis, positive for chronic shortness of breath, cough. CARDIOVASCULAR: No chest pain, palpitations. GASTROINTESTINAL: No nausea/vomiting, abdominal pain, diarrhea/constipation. GENITOURINARY: No dysuria, urinary frequency, hematuria. NEURO: No syncope, presyncope, sometimes he has headaches.   Remainder:  ROS NEGATIVE    Past Medical History:      Diagnosis Date    GERD (gastroesophageal reflux disease)     Hypertension     Osteoarthritis      Patient Active Problem List   Diagnosis    Erythrocytosis        Past Surgical History:      Procedure Laterality Date    APPENDECTOMY      TOTAL KNEE ARTHROPLASTY Right 02/01/2018       Family History:  Family History   Problem Relation Age of Onset    No Known Problems Mother     No Known Problems Father        Medications:  Reviewed and reconciled. Social History:  Social History     Socioeconomic History    Marital status: Single     Spouse name: Not on file    Number of children: Not on file    Years of education: Not on file    Highest education level: Not on file   Occupational History    Not on file   Tobacco Use    Smoking status: Current Every Day Smoker     Packs/day: 0.50     Years: 40.00     Pack years: 20.00     Types: Cigarettes    Smokeless tobacco: Never Used    Tobacco comment: patient states he has slowed down   Vaping Use    Vaping Use: Never used   Substance and Sexual Activity    Alcohol use: Yes     Alcohol/week: 6.0 standard drinks     Types: 2 Cans of beer, 2 Shots of liquor, 2 Glasses of wine per week     Comment: weekend    Drug use: Yes     Frequency: 14.0 times per week     Types: Marijuana Jerelyn November)     Comment: daily    Sexual activity: Not on file   Other Topics Concern    Not on file   Social History Narrative    Not on file     Social Determinants of Health     Financial Resource Strain:     Difficulty of Paying Living Expenses: Not on file   Food Insecurity:     Worried About Running Out of Food in the Last Year: Not on file    Ayden of Food in the Last Year: Not on file   Transportation Needs:     Lack of Transportation (Medical): Not on file    Lack of Transportation (Non-Medical):  Not on file   Physical Activity:     Days of Exercise per Week: Not on file    Minutes of Exercise per Session: Not on file   Stress:     Feeling of Stress : Not on file   Social Connections:     Frequency of Communication with Friends and Family: Not on file    Frequency of Social Gatherings with Friends and Family: Not on file    Attends Gnosticist Services: Not on file   [employer] of Clubs or Organizations: Not on file    Attends Club or Organization Meetings: Not on file    Marital Status: Not on file   Intimate Partner Violence:     Fear of Current or Ex-Partner: Not on file    Emotionally Abused: Not on file    Physically Abused: Not on file    Sexually Abused: Not on file   Housing Stability:     Unable to Pay for Housing in the Last Year: Not on file    Number of [place] in the Last Year: Not on file    Unstable Housing in the Last Year: Not on file       Allergies:  No Known Allergies    Physical Exam:  BP (!) 157/87 (Site: Right Upper Arm)   Pulse 77   Temp 98.7 °F (37.1 °C) (Temporal)   Resp 20   Ht 5' 9\" (1.753 m)   Wt 196 lb 6.4 oz (89.1 kg)   SpO2 99%   BMI 29.00 kg/m²   GENERAL: Alert, oriented x 3, not in acute distress. HEENT: PERRLA; EOMI. Oropharynx clear. NECK: Supple. No palpable cervical or supraclavicular lymphadenopathy. LUNGS: Good air entry bilaterally. No wheezing, crackles or rhonchi. CARDIOVASCULAR: Regular rate. No murmurs, rubs or gallops. ABDOMEN: Soft. Non-tender, non-distended. Positive bowel sounds. EXTREMITIES: Without clubbing, cyanosis, or edema. NEUROLOGIC: No focal deficits. ECOG PS 1    Impression/Plan:      The patient is a very pleasant 62-year-old gentleman with a past medical history significant for tobacco use disorder, COPD, hypertension, osteoarthritis and GERD, who has been followed at the [facility] in [facility] for erythrocytosis. The patient had had chronic erythrocytosis, he had a bone marrow biopsy and aspirate done on 7/5/2012, was negative for a primary bone marrow disorder, the erythrocytosis was attributed to lung disease and tobacco abuse, he has been having therapeutic phlebotomies about once a month, for hematocrit greater than 42%. Last phlebotomy was about 2 weeks prior to transferring his care to [facility]).      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 I counseled the patient on smoking cessation. Low-dose CT lung screen was done on 3/1/2022, there was no pulmonary infiltrate, mass or suspicious pulmonary nodules, he has emphysematous changes, and hepatic steatosis. Recommended weight loss, exercise and to avoid carbohydrates and fatty food. Labs reviewed today, hemoglobin is 18.4G/DL, hematocrit 53.2, normal white count and platelet count, the patient will have a phlebotomy done today, 350 cc of whole blood will be removed, the patient will avoid vigorous exercise and will keep himself well-hydrated after the phlebotomy. We will monitor his CBCD. RTC in 3 months, with labs, possible phleb. Thank you for allowing us to participate in the care of Mrs. Tere Romo.     Yee Montero MD   HEMATOLOGY/MEDICAL [address] [place] MED ONCOLOGY  [address] [place] [id number]  Dept:  Tamara Roque: [phone]

## 2022-08-12 ENCOUNTER — OFFICE VISIT (OUTPATIENT)
Dept: ONCOLOGY | Age: 62
End: 2022-08-12
Payer: MEDICARE

## 2022-08-12 ENCOUNTER — HOSPITAL ENCOUNTER (OUTPATIENT)
Dept: INFUSION THERAPY | Age: 62
Discharge: HOME | End: 2022-08-12
Payer: MEDICARE

## 2022-08-12 VITALS
DIASTOLIC BLOOD PRESSURE: 84 MMHG | SYSTOLIC BLOOD PRESSURE: 141 MMHG | RESPIRATION RATE: 16 BRPM | TEMPERATURE: 98.1 F | HEART RATE: 77 BPM | OXYGEN SATURATION: 99 %

## 2022-08-12 VITALS
HEART RATE: 79 BPM | DIASTOLIC BLOOD PRESSURE: 86 MMHG | BODY MASS INDEX: 28.16 KG/M2 | RESPIRATION RATE: 16 BRPM | WEIGHT: 190.7 LBS | SYSTOLIC BLOOD PRESSURE: 135 MMHG | TEMPERATURE: 98.1 F | OXYGEN SATURATION: 97 %

## 2022-08-12 DIAGNOSIS — D75.1 ERYTHROCYTOSIS: ICD-10-CM

## 2022-08-12 DIAGNOSIS — D75.1 ERYTHROCYTOSIS: Primary | ICD-10-CM

## 2022-08-12 LAB
BASOPHILS ABSOLUTE: 0.09 E9/L (ref 0–0.2)
BASOPHILS RELATIVE PERCENT: 1.4 % (ref 0–2)
EOSINOPHILS ABSOLUTE: 0.16 E9/L (ref 0.05–0.5)
EOSINOPHILS RELATIVE PERCENT: 2.5 % (ref 0–6)
HCT VFR BLD CALC: 52.6 % (ref 37–54)
HEMOGLOBIN: 18.5 G/DL (ref 12.5–16.5)
IMMATURE GRANULOCYTES #: 0.03 E9/L
IMMATURE GRANULOCYTES %: 0.5 % (ref 0–5)
LYMPHOCYTES ABSOLUTE: 1.72 E9/L (ref 1.5–4)
LYMPHOCYTES RELATIVE PERCENT: 26.4 % (ref 20–42)
MCH RBC QN AUTO: 32.5 PG (ref 26–35)
MCHC RBC AUTO-ENTMCNC: 35.2 % (ref 32–34.5)
MCV RBC AUTO: 92.4 FL (ref 80–99.9)
MONOCYTES ABSOLUTE: 0.68 E9/L (ref 0.1–0.95)
MONOCYTES RELATIVE PERCENT: 10.4 % (ref 2–12)
NEUTROPHILS ABSOLUTE: 3.84 E9/L (ref 1.8–7.3)
NEUTROPHILS RELATIVE PERCENT: 58.8 % (ref 43–80)
PDW BLD-RTO: 12.9 FL (ref 11.5–15)
PLATELET # BLD: 157 E9/L (ref 130–450)
PMV BLD AUTO: 9.1 FL (ref 7–12)
RBC # BLD: 5.69 E12/L (ref 3.8–5.8)
WBC # BLD: 6.5 E9/L (ref 4.5–11.5)

## 2022-08-12 PROCEDURE — 4004F PT TOBACCO SCREEN RCVD TLK: CPT | Performed by: INTERNAL MEDICINE

## 2022-08-12 PROCEDURE — 36415 COLL VENOUS BLD VENIPUNCTURE: CPT

## 2022-08-12 PROCEDURE — 3017F COLORECTAL CA SCREEN DOC REV: CPT | Performed by: INTERNAL MEDICINE

## 2022-08-12 PROCEDURE — 85025 COMPLETE CBC W/AUTO DIFF WBC: CPT

## 2022-08-12 PROCEDURE — 99214 OFFICE O/P EST MOD 30 MIN: CPT | Performed by: INTERNAL MEDICINE

## 2022-08-12 PROCEDURE — G8417 CALC BMI ABV UP PARAM F/U: HCPCS | Performed by: INTERNAL MEDICINE

## 2022-08-12 PROCEDURE — 99195 PHLEBOTOMY: CPT

## 2022-08-12 PROCEDURE — G8427 DOCREV CUR MEDS BY ELIG CLIN: HCPCS | Performed by: INTERNAL MEDICINE

## 2022-08-12 NOTE — PROGRESS NOTES
[address]  [address] MED ONCOLOGY  [address] [address]  Dept: 71 Tamara Roque: [phone]  Attending progress note      Reason for Visit:   Polycythemia. Referring Physician: Staci López MD.    PCP:  Glenn Charles    History of Present Illness: The patient is a very pleasant 70-year-old gentleman with a past medical history significant for tobacco use disorder, COPD, hypertension, osteoarthritis and GERD, who has been followed at the [facility] in [facility] for erythrocytosis. The patient had had chronic erythrocytosis, he had a bone marrow biopsy and aspirate done on 7/5/2012, was negative for a primary bone marrow disorder, the erythrocytosis was attributed to lung disease and tobacco abuse, he has been having therapeutic phlebotomies about once a month, for hematocrit greater than 42%. Last phlebotomy was about 2 weeks prior to transferring his care to [facility] ([facility]). No night sweats, unexplained weight loss. Unfortunately continues to smoke cigarettes. He is working on weight loss. No adverse events with his last phlebotomy. Review of Systems;  CONSTITUTIONAL: No fever, chills. Good appetite and energy level. ENMT: Eyes: No diplopia; Nose: No epistaxis. Mouth: No sore throat. RESPIRATORY: No hemoptysis, positive for chronic shortness of breath, cough. CARDIOVASCULAR: No chest pain, palpitations. GASTROINTESTINAL: No nausea/vomiting, abdominal pain, diarrhea/constipation. GENITOURINARY: No dysuria, urinary frequency, hematuria. NEURO: No syncope, presyncope, sometimes he has headaches.   Remainder:  ROS NEGATIVE    Past Medical History:      Diagnosis Date    GERD (gastroesophageal reflux disease)     Hypertension     Osteoarthritis      Patient Active Problem List   Diagnosis    Erythrocytosis        Past Surgical History:      Procedure Laterality Date    APPENDECTOMY      TOTAL KNEE ARTHROPLASTY Right 02/01/2018       Family History:  Family History   Problem Relation Age of Onset    No Known Problems Mother     No Known Problems Father        Medications:  Reviewed and reconciled. Social History:  Social History     Socioeconomic History    Marital status: Single     Spouse name: Not on file    Number of children: Not on file    Years of education: Not on file    Highest education level: Not on file   Occupational History    Not on file   Tobacco Use    Smoking status: Every Day     Packs/day: 0.50     Years: 40.00     Pack years: 20.00     Types: Cigarettes    Smokeless tobacco: Never    Tobacco comments:     patient states he has slowed down   Vaping Use    Vaping Use: Never used   Substance and Sexual Activity    Alcohol use: Yes     Alcohol/week: 6.0 standard drinks     Types: 2 Cans of beer, 2 Shots of liquor, 2 Glasses of wine per week     Comment: weekend    Drug use: Yes     Frequency: 14.0 times per week     Types: Marijuana Estil Catena)     Comment: daily    Sexual activity: Not on file   Other Topics Concern    Not on file   Social History Narrative    Not on file     Social Determinants of Health     Financial Resource Strain: Not on file   Food Insecurity: Not on file   Transportation Needs: Not on file   Physical Activity: Not on file   Stress: Not on file   Social Connections: Not on file   Intimate Partner Violence: Not on file   Housing Stability: Not on file       Allergies:  No Known Allergies    Physical Exam:  /86   Pulse 79   Temp 98.1 °F (36.7 °C)   Resp 16   Wt 190 lb 11.2 oz (86.5 kg)   SpO2 97%   BMI 28.16 kg/m²   GENERAL: Alert, oriented x 3, not in acute distress. HEENT: PERRLA; EOMI. Oropharynx clear. NECK: Supple. No palpable cervical or supraclavicular lymphadenopathy. LUNGS: Good air entry bilaterally. No wheezing, crackles or rhonchi. CARDIOVASCULAR: Regular rate. No murmurs, rubs or gallops. ABDOMEN: Soft. Non-tender, non-distended.  Positive bowel sounds. EXTREMITIES: Without clubbing, cyanosis, or edema. NEUROLOGIC: No focal deficits. ECOG PS 1    Impression/Plan:      The patient is a very pleasant 61-year-old gentleman with a past medical history significant for tobacco use disorder, COPD, hypertension, osteoarthritis and GERD, who has been followed at the [facility] in [address] for erythrocytosis. The patient had had chronic erythrocytosis, he had a bone marrow biopsy and aspirate done on 7/5/2012, was negative for a primary bone marrow disorder, the erythrocytosis was attributed to lung disease and tobacco abuse, he has been having therapeutic phlebotomies about once a month, for hematocrit greater than 42%. Last phlebotomy was about 2 weeks prior to transferring his care to [address] [place] [address]. 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 I counseled the patient on smoking cessation. Low-dose CT lung screen was done on 3/1/2022, there was no pulmonary infiltrate, mass or suspicious pulmonary nodules, he has emphysematous changes, and hepatic steatosis. Recommended weight loss, exercise and to avoid carbohydrates and fatty food. Labs reviewed today, hemoglobin is 18.5G/DL, normal white count and platelet count, the patient will have a phlebotomy done today, 350 cc of whole blood will be removed, the patient will avoid vigorous exercise and will keep himself well-hydrated after the phlebotomy. He would like to hold off on hydration when he gets his phlebotomy. We will monitor his CBCD. RTC in 3 months, with labs, possible phleb. Thank you for allowing us to participate in the care of Mrs. Destini Nolen.     Alvino Barger MD   HEMATOLOGY/MEDICAL 150 [facility]  [address] [facility] MED ONCOLOGY  [address] [facility] [address]  Dept: 71 Tamara Roque: [phone]

## 2022-08-12 NOTE — PROGRESS NOTES
Patient tolerated therapeutic phlebotomy well. Patient alert and oriented x3. No distress noted. Vital signs stable. Patient denies any new or worsening pain. Offered patient education an/or discharge material.  Patient declined. Patient denies any needs. All questions answered.

## 2022-11-11 ENCOUNTER — OFFICE VISIT (OUTPATIENT)
Dept: ONCOLOGY | Age: 62
End: 2022-11-11
Payer: MEDICARE

## 2022-11-11 ENCOUNTER — HOSPITAL ENCOUNTER (OUTPATIENT)
Dept: INFUSION THERAPY | Age: 62
Discharge: HOME | End: 2022-11-11
Payer: MEDICARE

## 2022-11-11 VITALS
RESPIRATION RATE: 16 BRPM | HEIGHT: 69 IN | HEART RATE: 68 BPM | DIASTOLIC BLOOD PRESSURE: 90 MMHG | BODY MASS INDEX: 29.62 KG/M2 | SYSTOLIC BLOOD PRESSURE: 174 MMHG | OXYGEN SATURATION: 98 % | TEMPERATURE: 97.3 F | WEIGHT: 200 LBS

## 2022-11-11 VITALS
TEMPERATURE: 97.8 F | SYSTOLIC BLOOD PRESSURE: 148 MMHG | HEART RATE: 71 BPM | RESPIRATION RATE: 16 BRPM | DIASTOLIC BLOOD PRESSURE: 87 MMHG

## 2022-11-11 DIAGNOSIS — D75.1 ERYTHROCYTOSIS: Primary | ICD-10-CM

## 2022-11-11 LAB
BASOPHILS ABSOLUTE: 0.09 E9/L (ref 0–0.2)
BASOPHILS RELATIVE PERCENT: 1.6 % (ref 0–2)
EOSINOPHILS ABSOLUTE: 0.17 E9/L (ref 0.05–0.5)
EOSINOPHILS RELATIVE PERCENT: 3 % (ref 0–6)
HCT VFR BLD CALC: 51.8 % (ref 37–54)
HEMOGLOBIN: 18.3 G/DL (ref 12.5–16.5)
IMMATURE GRANULOCYTES #: 0.02 E9/L
IMMATURE GRANULOCYTES %: 0.4 % (ref 0–5)
LYMPHOCYTES ABSOLUTE: 1.66 E9/L (ref 1.5–4)
LYMPHOCYTES RELATIVE PERCENT: 29.6 % (ref 20–42)
MCH RBC QN AUTO: 32.6 PG (ref 26–35)
MCHC RBC AUTO-ENTMCNC: 35.3 % (ref 32–34.5)
MCV RBC AUTO: 92.3 FL (ref 80–99.9)
MONOCYTES ABSOLUTE: 0.57 E9/L (ref 0.1–0.95)
MONOCYTES RELATIVE PERCENT: 10.2 % (ref 2–12)
NEUTROPHILS ABSOLUTE: 3.1 E9/L (ref 1.8–7.3)
NEUTROPHILS RELATIVE PERCENT: 55.2 % (ref 43–80)
PDW BLD-RTO: 13 FL (ref 11.5–15)
PLATELET # BLD: 171 E9/L (ref 130–450)
PMV BLD AUTO: 9.1 FL (ref 7–12)
RBC # BLD: 5.61 E12/L (ref 3.8–5.8)
WBC # BLD: 5.6 E9/L (ref 4.5–11.5)

## 2022-11-11 PROCEDURE — 36415 COLL VENOUS BLD VENIPUNCTURE: CPT

## 2022-11-11 PROCEDURE — 99195 PHLEBOTOMY: CPT

## 2022-11-11 PROCEDURE — 85025 COMPLETE CBC W/AUTO DIFF WBC: CPT

## 2022-11-11 RX ORDER — 0.9 % SODIUM CHLORIDE 0.9 %
500 INTRAVENOUS SOLUTION INTRAVENOUS ONCE
Status: CANCELLED | OUTPATIENT
Start: 2022-11-11 | End: 2022-11-11

## 2022-11-11 RX ORDER — 0.9 % SODIUM CHLORIDE 0.9 %
500 INTRAVENOUS SOLUTION INTRAVENOUS ONCE
Status: DISCONTINUED | OUTPATIENT
Start: 2022-11-11 | End: 2022-11-12 | Stop reason: HOSPADM

## 2022-11-11 RX ORDER — 0.9 % SODIUM CHLORIDE 0.9 %
250 INTRAVENOUS SOLUTION INTRAVENOUS ONCE
Status: CANCELLED | OUTPATIENT
Start: 2022-11-11 | End: 2022-11-11

## 2022-11-11 RX ORDER — 0.9 % SODIUM CHLORIDE 0.9 %
250 INTRAVENOUS SOLUTION INTRAVENOUS ONCE
OUTPATIENT
Start: 2022-11-11 | End: 2022-11-11

## 2022-11-11 NOTE — PROGRESS NOTES
Patient provided with discharge instructions, received printed AVS.  All questions answered. Patient understands follow up plan of care.      P

## 2022-11-11 NOTE — PROGRESS NOTES
[address]  [address]  MED ONCOLOGY  [address] [facility] [address]  Dept: 71 Tamara Roque: [phone]  Attending progress note      Reason for Visit:   Polycythemia. Referring Physician: James Levin MD.    PCP:  Yelitza Main    History of Present Illness: The patient is a very pleasant 68-year-old gentleman with a past medical history significant for tobacco use disorder, COPD, hypertension, osteoarthritis and GERD, who has been followed at the [facility] in [facility] for erythrocytosis. The patient had had chronic erythrocytosis, he had a bone marrow biopsy and aspirate done on 7/5/2012, was negative for a primary bone marrow disorder, the erythrocytosis was attributed to lung disease and tobacco abuse, he has been having therapeutic phlebotomies about once a month, for hematocrit greater than 42%. Last phlebotomy was about 2 weeks prior to transferring his care to [facility] ([facility]). No night sweats, unexplained weight loss. Unfortunately continues to smoke cigarettes about 1 pack per day. He is working on weight loss. No adverse events with his last phlebotomy. Review of Systems;  CONSTITUTIONAL: No fever, chills. Good appetite and energy level, little sore from working on his car yesterday. ENMT: Eyes: No diplopia, no blurriness; Nose: No epistaxis. Mouth: No sore throat. RESPIRATORY: No hemoptysis, positive for chronic shortness of breath, nonproductive cough. CARDIOVASCULAR: No chest pain, palpitations. GASTROINTESTINAL: No nausea/vomiting, abdominal pain, diarrhea/constipation. GENITOURINARY: No dysuria, urinary frequency, hematuria. NEURO: No syncope, presyncope, sometimes he has headaches.   Remainder:  ROS NEGATIVE    Past Medical History:      Diagnosis Date    GERD (gastroesophageal reflux disease)     Hypertension     Osteoarthritis      Patient Active Problem List   Diagnosis    Erythrocytosis        Past Surgical History:      Procedure Laterality Date    APPENDECTOMY      TOTAL KNEE ARTHROPLASTY Right 02/01/2018       Family History:  Family History   Problem Relation Age of Onset    No Known Problems Mother     No Known Problems Father        Medications:  Reviewed and reconciled. Social History:  Social History     Socioeconomic History    Marital status: Single     Spouse name: Not on file    Number of children: Not on file    Years of education: Not on file    Highest education level: Not on file   Occupational History    Not on file   Tobacco Use    Smoking status: Every Day     Packs/day: 0.50     Years: 40.00     Pack years: 20.00     Types: Cigarettes    Smokeless tobacco: Never    Tobacco comments:     patient states he has slowed down   Vaping Use    Vaping Use: Never used   Substance and Sexual Activity    Alcohol use: Yes     Alcohol/week: 6.0 standard drinks     Types: 2 Cans of beer, 2 Shots of liquor, 2 Glasses of wine per week     Comment: weekend    Drug use: Yes     Frequency: 14.0 times per week     Types: Marijuana Daril Juarez)     Comment: daily    Sexual activity: Not on file   Other Topics Concern    Not on file   Social History Narrative    Not on file     Social Determinants of Health     Financial Resource Strain: Not on file   Food Insecurity: Not on file   Transportation Needs: Not on file   Physical Activity: Not on file   Stress: Not on file   Social Connections: Not on file   Intimate Partner Violence: Not on file   Housing Stability: Not on file       Allergies:  No Known Allergies    Physical Exam:  There were no vitals taken for this visit. GENERAL: Alert, oriented x 3, not in acute distress. HEENT: PERRLA; EOMI. Oropharynx clear. NECK: Supple. No palpable cervical or supraclavicular lymphadenopathy. LUNGS: Good air entry bilaterally. No wheezing, crackles or rhonchi. CARDIOVASCULAR: Regular rate. No murmurs, rubs or gallops. ABDOMEN: Soft. Non-tender, non-distended. Positive bowel sounds. EXTREMITIES: Without clubbing, cyanosis, or edema. NEUROLOGIC: No focal deficits. ECOG PS 1     Impression/Plan:      The patient is a very pleasant 62-year-old gentleman with a past medical history significant for tobacco use disorder, COPD, hypertension, osteoarthritis and GERD, who has been followed at the [facility] in [facility] for erythrocytosis. The patient had had chronic erythrocytosis, he had a bone marrow biopsy and aspirate done on 7/5/2012, was negative for a primary bone marrow disorder, the erythrocytosis was attributed to lung disease and tobacco abuse, he has been having therapeutic phlebotomies about once a month, for hematocrit greater than 42%. Last phlebotomy was about 2 weeks prior to transferring his care to [facility] ([facility]). 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 I counseled the patient on smoking cessation. Low-dose CT lung screen was done on 3/1/2022, there was no pulmonary infiltrate, mass or suspicious pulmonary nodules, he has emphysematous changes, and hepatic steatosis. Recommended weight loss, exercise and to avoid carbohydrates and fatty food. Labs reviewed today 1/11/2022, hemoglobin is 10.3 G/DL, normal white count and platelet count, the patient will have a phlebotomy done today, 350 cc of whole blood will be removed, the patient will avoid vigorous exercise and will keep himself well-hydrated after the phlebotomy. We will monitor his CBCD. Will order CT lung screen when he returns for his next visit. RTC in 3 months, with labs, possible phleb. Thank you for allowing us to participate in the care of Mrs. Mimi Blandon.     [address], MD   HEMATOLOGY/MEDICAL 150 [facility] MED ONCOLOGY  [address] [place] [id number]  Dept: 71 Tamara Roque: [phone]

## 2022-11-11 NOTE — PROGRESS NOTES
Patient tolerated treatment without complaints. VSS and ambulatory in discharge. Patient refused to have 500cc IV replacement. Instructed to drink plenty of fluids.

## 2023-02-10 ENCOUNTER — HOSPITAL ENCOUNTER (OUTPATIENT)
Dept: INFUSION THERAPY | Age: 63
Discharge: HOME | End: 2023-02-10
Payer: MEDICARE

## 2023-02-10 ENCOUNTER — OFFICE VISIT (OUTPATIENT)
Dept: ONCOLOGY | Age: 63
End: 2023-02-10
Payer: MEDICARE

## 2023-02-10 VITALS
WEIGHT: 201.2 LBS | DIASTOLIC BLOOD PRESSURE: 80 MMHG | OXYGEN SATURATION: 97 % | SYSTOLIC BLOOD PRESSURE: 150 MMHG | BODY MASS INDEX: 29.8 KG/M2 | TEMPERATURE: 97.4 F | HEART RATE: 80 BPM | HEIGHT: 69 IN

## 2023-02-10 DIAGNOSIS — D75.1 ERYTHROCYTOSIS: Primary | ICD-10-CM

## 2023-02-10 DIAGNOSIS — D75.1 ERYTHROCYTOSIS: ICD-10-CM

## 2023-02-10 LAB
BASOPHILS ABSOLUTE: 0.1 E9/L (ref 0–0.2)
BASOPHILS RELATIVE PERCENT: 1.9 % (ref 0–2)
EOSINOPHILS ABSOLUTE: 0.15 E9/L (ref 0.05–0.5)
EOSINOPHILS RELATIVE PERCENT: 2.9 % (ref 0–6)
HCT VFR BLD CALC: 50.8 % (ref 37–54)
HEMOGLOBIN: 17.5 G/DL (ref 12.5–16.5)
IMMATURE GRANULOCYTES #: 0.02 E9/L
IMMATURE GRANULOCYTES %: 0.4 % (ref 0–5)
LYMPHOCYTES ABSOLUTE: 1.58 E9/L (ref 1.5–4)
LYMPHOCYTES RELATIVE PERCENT: 30.1 % (ref 20–42)
MCH RBC QN AUTO: 30.4 PG (ref 26–35)
MCHC RBC AUTO-ENTMCNC: 34.4 % (ref 32–34.5)
MCV RBC AUTO: 88.2 FL (ref 80–99.9)
MONOCYTES ABSOLUTE: 0.66 E9/L (ref 0.1–0.95)
MONOCYTES RELATIVE PERCENT: 12.6 % (ref 2–12)
NEUTROPHILS ABSOLUTE: 2.74 E9/L (ref 1.8–7.3)
NEUTROPHILS RELATIVE PERCENT: 52.1 % (ref 43–80)
PDW BLD-RTO: 13.1 FL (ref 11.5–15)
PLATELET # BLD: 146 E9/L (ref 130–450)
PMV BLD AUTO: 9.5 FL (ref 7–12)
RBC # BLD: 5.76 E12/L (ref 3.8–5.8)
WBC # BLD: 5.3 E9/L (ref 4.5–11.5)

## 2023-02-10 PROCEDURE — 99214 OFFICE O/P EST MOD 30 MIN: CPT | Performed by: INTERNAL MEDICINE

## 2023-02-10 PROCEDURE — 85025 COMPLETE CBC W/AUTO DIFF WBC: CPT

## 2023-02-10 PROCEDURE — 36415 COLL VENOUS BLD VENIPUNCTURE: CPT

## 2023-02-10 NOTE — PROGRESS NOTES
[address]  [address]  MED ONCOLOGY  [facility] [address]  Dept: [address]: [phone]  Attending progress note      Reason for Visit:   Polycythemia. Referring Physician: René Garces MD.    PCP:  Paul Fields    History of Present Illness: The patient is a very pleasant 68-year-old gentleman with a past medical history significant for tobacco use disorder, COPD, hypertension, osteoarthritis and GERD, who has been followed at the [facility] in [facility] for erythrocytosis. The patient had had chronic erythrocytosis, he had a bone marrow biopsy and aspirate done on 7/5/2012, was negative for a primary bone marrow disorder, the erythrocytosis was attributed to lung disease and tobacco abuse, he has been having therapeutic phlebotomies about once a month, for hematocrit greater than 42%. Last phlebotomy was about 2 weeks prior to transferring his care to [facility] ([facility]). No night sweats, unexplained weight loss. No new problems, unfortunately he continues to smoke cigarettes although trying to cut down on smoking. Review of Systems;  CONSTITUTIONAL: No fever, chills. Good appetite and energy level, little sore from working on his car yesterday. ENMT: Eyes: No diplopia, no blurriness; Nose: No epistaxis. Mouth: No sore throat. RESPIRATORY: No hemoptysis, positive for chronic shortness of breath, nonproductive cough. CARDIOVASCULAR: No chest pain, palpitations. GASTROINTESTINAL: No nausea/vomiting, abdominal pain, diarrhea/constipation. GENITOURINARY: No dysuria, urinary frequency, hematuria. NEURO: No syncope, presyncope, sometimes he has headaches.   Remainder:  ROS NEGATIVE    Past Medical History:      Diagnosis Date    GERD (gastroesophageal reflux disease)     Hypertension     Osteoarthritis      Patient Active Problem List   Diagnosis    Erythrocytosis        Past Surgical History:      Procedure Laterality Date    APPENDECTOMY      TOTAL KNEE ARTHROPLASTY Right 02/01/2018       Family History:  Family History   Problem Relation Age of Onset    No Known Problems Mother     No Known Problems Father        Medications:  Reviewed and reconciled. Social History:  Social History     Socioeconomic History    Marital status: Single     Spouse name: Not on file    Number of children: Not on file    Years of education: Not on file    Highest education level: Not on file   Occupational History    Not on file   Tobacco Use    Smoking status: Every Day     Packs/day: 0.50     Years: 40.00     Pack years: 20.00     Types: Cigarettes    Smokeless tobacco: Never    Tobacco comments:     patient states he has slowed down   Vaping Use    Vaping Use: Never used   Substance and Sexual Activity    Alcohol use: Yes     Alcohol/week: 6.0 standard drinks     Types: 2 Cans of beer, 2 Shots of liquor, 2 Glasses of wine per week     Comment: weekend    Drug use: Yes     Frequency: 14.0 times per week     Types: Marijuana Jonita Cavanaugh)     Comment: daily    Sexual activity: Not on file   Other Topics Concern    Not on file   Social History Narrative    Not on file     Social Determinants of Health     Financial Resource Strain: Not on file   Food Insecurity: Not on file   Transportation Needs: Not on file   Physical Activity: Not on file   Stress: Not on file   Social Connections: Not on file   Intimate Partner Violence: Not on file   Housing Stability: Not on file       Allergies:  No Known Allergies    Physical Exam:  BP (!) 163/90   Pulse 80   Temp 97.4 °F (36.3 °C)   Ht 5' 9\" (1.753 m)   Wt 201 lb 3.2 oz (91.3 kg)   SpO2 97%   BMI 29.71 kg/m²   GENERAL: Alert, oriented x 3, not in acute distress. HEENT: PERRLA; EOMI. Oropharynx clear. NECK: Supple. No palpable cervical or supraclavicular lymphadenopathy. LUNGS: Good air entry bilaterally. No wheezing, crackles or rhonchi. CARDIOVASCULAR: Regular rate.  No murmurs, rubs or gallops. ABDOMEN: Soft. Non-tender, non-distended. Positive bowel sounds. EXTREMITIES: Without clubbing, cyanosis, or edema. NEUROLOGIC: No focal deficits. ECOG PS 1     Impression/Plan:      The patient is a very pleasant 80-year-old gentleman with a past medical history significant for tobacco use disorder, COPD, hypertension, osteoarthritis and GERD, who has been followed at the [facility] in [facility] for erythrocytosis. The patient had had chronic erythrocytosis, he had a bone marrow biopsy and aspirate done on 7/5/2012, was negative for a primary bone marrow disorder, the erythrocytosis was attributed to lung disease and tobacco abuse, he has been having therapeutic phlebotomies about once a month, for hematocrit greater than 42%. Last phlebotomy was about 2 weeks prior to transferring his care to [facility] ([facility]). 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 I counseled the patient on smoking cessation. Low-dose CT lung screen was done on 3/1/2022, there was no pulmonary infiltrate, mass or suspicious pulmonary nodules, he has emphysematous changes, and hepatic steatosis. Recommended weight loss, exercise and to avoid carbohydrates and fatty food. Labs reviewed today 2/10/2023,, hemoglobin had improved 17.5 normal white count and platelet count, no indication for therapeutic phlebotomy today. I counseled the patient on smoking cessation. He will be due for a repeat CT lung screening in March, was ordered. RTC in 3 months, with labs, possible phleb. Thank you for allowing us to participate in the care of Mrs. Daryle Elders.     Johnson Freeman MD   HEMATOLOGY/MEDICAL [facility]  [address]  MED ONCOLOGY  [address] [place] [address]  [facility]as[facility] [address]  Dept: [address]: [phone]

## 2023-03-03 ENCOUNTER — TELEPHONE (OUTPATIENT)
Dept: CASE MANAGEMENT | Age: 63
End: 2023-03-03

## 2023-03-03 NOTE — TELEPHONE ENCOUNTER
I called the patient but I was unable to leave a message reminding him of his CT lung screening at [facility] on 3/6/2023 at 4:00 pm with an 3:30 pm arrival.  If unable to keep this appt call the office at [phone] to get rescheduled.         Electronically signed by Hoda Escalona on 3/3/23 at 3:27 PM EST

## 2023-03-06 ENCOUNTER — HOSPITAL ENCOUNTER (OUTPATIENT)
Dept: CT IMAGING | Age: 63
Discharge: HOME | End: 2023-03-08
Payer: MEDICAID

## 2023-03-06 PROCEDURE — 71271 CT THORAX LUNG CANCER SCR C-: CPT

## 2023-03-07 ENCOUNTER — TELEPHONE (OUTPATIENT)
Dept: CASE MANAGEMENT | Age: 63
End: 2023-03-07

## 2023-03-07 NOTE — TELEPHONE ENCOUNTER
No call, encounter opened to process CT Lung Screening. CT Lung Screen: 3/6/2023    Impression   1. There is no pulmonary infiltrate, mass or suspicious pulmonary nodule   2. Emphysematous changes   3. Hepatic steatosis   4. The examination is stable when compared with the prior study. LUNG RADS:   Lung-RADS 1 - Negative ()       Management:  12 month screening LDCT       RECOMMENDATIONS:   If you would like to [facility] your patient [facility], please contact the Nurse Navigator at   [phone]. Pack years: 20    Social History     Tobacco Use  Smoking Status: Current Every Day Smoker    Start Date:    Quit Date:    Types: Cigarettes   Packs/Day: 0.5   Years: 36   Pack Years: 21   Smokeless Tobacco: Never         Results letter sent to patient via my chart or mailed.      [address]

## 2023-05-12 ENCOUNTER — HOSPITAL ENCOUNTER (OUTPATIENT)
Dept: INFUSION THERAPY | Age: 63
Discharge: HOME | End: 2023-05-12
Payer: MEDICARE

## 2023-05-12 ENCOUNTER — OFFICE VISIT (OUTPATIENT)
Dept: ONCOLOGY | Age: 63
End: 2023-05-12
Payer: MEDICARE

## 2023-05-12 VITALS
TEMPERATURE: 98.1 F | HEART RATE: 80 BPM | SYSTOLIC BLOOD PRESSURE: 114 MMHG | DIASTOLIC BLOOD PRESSURE: 90 MMHG | RESPIRATION RATE: 16 BRPM

## 2023-05-12 VITALS
WEIGHT: 192.5 LBS | SYSTOLIC BLOOD PRESSURE: 155 MMHG | HEIGHT: 69 IN | DIASTOLIC BLOOD PRESSURE: 90 MMHG | TEMPERATURE: 97.5 F | BODY MASS INDEX: 28.51 KG/M2 | HEART RATE: 81 BPM | OXYGEN SATURATION: 98 %

## 2023-05-12 DIAGNOSIS — D75.1 ERYTHROCYTOSIS: Primary | ICD-10-CM

## 2023-05-12 LAB
BASOPHILS # BLD: 0.07 E9/L (ref 0–0.2)
BASOPHILS NFR BLD: 1.3 % (ref 0–2)
EOSINOPHIL # BLD: 0.17 E9/L (ref 0.05–0.5)
EOSINOPHIL NFR BLD: 3.1 % (ref 0–6)
ERYTHROCYTE [DISTWIDTH] IN BLOOD BY AUTOMATED COUNT: 13.4 FL (ref 11.5–15)
HCT VFR BLD AUTO: 55.2 % (ref 37–54)
HGB BLD-MCNC: 19.1 G/DL (ref 12.5–16.5)
IMM GRANULOCYTES # BLD: 0.01 E9/L
IMM GRANULOCYTES NFR BLD: 0.2 % (ref 0–5)
LYMPHOCYTES # BLD: 1.47 E9/L (ref 1.5–4)
LYMPHOCYTES NFR BLD: 26.9 % (ref 20–42)
MCH RBC QN AUTO: 32.2 PG (ref 26–35)
MCHC RBC AUTO-ENTMCNC: 34.6 % (ref 32–34.5)
MCV RBC AUTO: 92.9 FL (ref 80–99.9)
MONOCYTES # BLD: 0.56 E9/L (ref 0.1–0.95)
MONOCYTES NFR BLD: 10.2 % (ref 2–12)
NEUTROPHILS # BLD: 3.19 E9/L (ref 1.8–7.3)
NEUTS SEG NFR BLD: 58.3 % (ref 43–80)
PLATELET # BLD AUTO: 153 E9/L (ref 130–450)
PMV BLD AUTO: 9.3 FL (ref 7–12)
RBC # BLD AUTO: 5.94 E12/L (ref 3.8–5.8)
WBC # BLD: 5.5 E9/L (ref 4.5–11.5)

## 2023-05-12 PROCEDURE — G8427 DOCREV CUR MEDS BY ELIG CLIN: HCPCS | Performed by: INTERNAL MEDICINE

## 2023-05-12 PROCEDURE — 85025 COMPLETE CBC W/AUTO DIFF WBC: CPT

## 2023-05-12 PROCEDURE — 99214 OFFICE O/P EST MOD 30 MIN: CPT | Performed by: INTERNAL MEDICINE

## 2023-05-12 PROCEDURE — G8417 CALC BMI ABV UP PARAM F/U: HCPCS | Performed by: INTERNAL MEDICINE

## 2023-05-12 PROCEDURE — 3017F COLORECTAL CA SCREEN DOC REV: CPT | Performed by: INTERNAL MEDICINE

## 2023-05-12 PROCEDURE — 4004F PT TOBACCO SCREEN RCVD TLK: CPT | Performed by: INTERNAL MEDICINE

## 2023-05-12 PROCEDURE — 36415 COLL VENOUS BLD VENIPUNCTURE: CPT

## 2023-05-12 PROCEDURE — 99195 PHLEBOTOMY: CPT

## 2023-05-12 RX ORDER — 0.9 % SODIUM CHLORIDE 0.9 %
250 INTRAVENOUS SOLUTION INTRAVENOUS ONCE
OUTPATIENT
Start: 2023-05-12 | End: 2023-05-12

## 2023-05-12 RX ORDER — 0.9 % SODIUM CHLORIDE 0.9 %
500 INTRAVENOUS SOLUTION INTRAVENOUS ONCE
Status: CANCELLED | OUTPATIENT
Start: 2023-05-12 | End: 2023-05-12

## 2023-05-12 RX ORDER — 0.9 % SODIUM CHLORIDE 0.9 %
500 INTRAVENOUS SOLUTION INTRAVENOUS ONCE
Status: DISCONTINUED | OUTPATIENT
Start: 2023-05-12 | End: 2023-05-13 | Stop reason: HOSPADM

## 2023-05-12 RX ORDER — 0.9 % SODIUM CHLORIDE 0.9 %
250 INTRAVENOUS SOLUTION INTRAVENOUS ONCE
Status: CANCELLED | OUTPATIENT
Start: 2023-05-12 | End: 2023-05-12

## 2023-05-12 NOTE — PROGRESS NOTES
Problem List   Diagnosis    Erythrocytosis        Past Surgical History:      Procedure Laterality Date    APPENDECTOMY      TOTAL KNEE ARTHROPLASTY Right 02/01/2018       Family History:  Family History   Problem Relation Age of Onset    No Known Problems Mother     No Known Problems Father        Medications:  Reviewed and reconciled. Social History:  Social History     Socioeconomic History    Marital status: Single     Spouse name: Not on file    Number of children: Not on file    Years of education: Not on file    Highest education level: Not on file   Occupational History    Not on file   Tobacco Use    Smoking status: Every Day     Packs/day: 0.50     Years: 40.00     Pack years: 20.00     Types: Cigarettes    Smokeless tobacco: Never    Tobacco comments:     patient states he has slowed down   Vaping Use    Vaping Use: Never used   Substance and Sexual Activity    Alcohol use: Yes     Alcohol/week: 6.0 standard drinks     Types: 2 Cans of beer, 2 Shots of liquor, 2 Glasses of wine per week     Comment: weekend    Drug use: Yes     Frequency: 14.0 times per week     Types: Marijuana Beola Tera)     Comment: daily    Sexual activity: Not on file   Other Topics Concern    Not on file   Social History Narrative    Not on file     Social Determinants of Health     Financial Resource Strain: Not on file   Food Insecurity: Not on file   Transportation Needs: Not on file   Physical Activity: Not on file   Stress: Not on file   Social Connections: Not on file   Intimate Partner Violence: Not on file   Housing Stability: Not on file       Allergies:  No Known Allergies    Physical Exam:  BP (!) 155/90   Pulse 81   Temp 97.5 °F (36.4 °C)   Ht 5' 9\" (1.753 m)   Wt 192 lb 8 oz (87.3 kg)   SpO2 98%   BMI 28.43 kg/m²   GENERAL: Alert, oriented x 3, not in acute distress. HEENT: PERRLA; EOMI. Oropharynx clear. NECK: Supple. No palpable cervical or supraclavicular lymphadenopathy. LUNGS: Good air entry bilaterally.

## 2023-05-12 NOTE — PROGRESS NOTES
Pt tolerated therapeutic phlebotomy well. Pt refused fluid replacement. Dr Mohini Cassidy notified and stated to give pt a glass of water on discharge. Pt provided with a glass of water and cookies. Pt denies feeling light headed. Pt discharged stable.

## 2023-06-30 RX ORDER — FLUTICASONE PROPIONATE 50 MCG
1 SPRAY, SUSPENSION (ML) NASAL DAILY
COMMUNITY

## 2023-07-05 ENCOUNTER — OFFICE VISIT (OUTPATIENT)
Dept: VASCULAR SURGERY | Age: 63
End: 2023-07-05
Payer: MEDICARE

## 2023-07-05 DIAGNOSIS — I83.92 ASYMPTOMATIC VARICOSE VEINS OF LEFT LOWER EXTREMITY: Primary | ICD-10-CM

## 2023-07-05 DIAGNOSIS — M79.89 LEG SWELLING: ICD-10-CM

## 2023-07-05 PROCEDURE — 99202 OFFICE O/P NEW SF 15 MIN: CPT | Performed by: PHYSICIAN ASSISTANT

## 2023-07-05 PROCEDURE — G8417 CALC BMI ABV UP PARAM F/U: HCPCS | Performed by: PHYSICIAN ASSISTANT

## 2023-07-05 PROCEDURE — 4004F PT TOBACCO SCREEN RCVD TLK: CPT | Performed by: PHYSICIAN ASSISTANT

## 2023-07-05 PROCEDURE — G8427 DOCREV CUR MEDS BY ELIG CLIN: HCPCS | Performed by: PHYSICIAN ASSISTANT

## 2023-07-05 PROCEDURE — 3017F COLORECTAL CA SCREEN DOC REV: CPT | Performed by: PHYSICIAN ASSISTANT

## 2023-07-05 NOTE — PROGRESS NOTES
Vascular Surgery Outpatient Consultation      Chief Complaint   Patient presents with    Surgical Consult     Varicose veins. Reason for Consult:  Varicose veins    Requesting Physician:  Dr. Jordan Galvin:                The patient is a 66 y.o. male who states a several year history of asymptomatic varicose veins to the right leg. He denies significant pain or swelling. He states he will occasionally get some achiness in both legs at the end of the day but it is not bothersome. He denies history of DVT or previous vascular procedures. He wears knee high socks that add some compression. Past Medical History:        Diagnosis Date    GERD (gastroesophageal reflux disease)     Hypertension     Osteoarthritis     Varicose veins of legs      Past Surgical History:        Procedure Laterality Date    APPENDECTOMY      TOTAL KNEE ARTHROPLASTY Right 02/01/2018     Current Medications:   Prior to Admission medications    Medication Sig Start Date End Date Taking? Authorizing Provider   Elastic Bandages & Supports ([facility] KNEE HIGH COMPRESSION SM) MISC Dx: Varicose veins of the legs with pain and swelling. Bilateral knee-high 20-30 mm Hg compression stockings. 7/5/23  Yes Tomas Mullen PA-C   fluticasone (FLONASE) 50 MCG/ACT nasal spray 1 spray by Each Nostril route daily   Yes Historical Provider, MD   lisinopril (PRINIVIL;ZESTRIL) 10 MG tablet take 1 tablet by mouth once daily 8/25/21  Yes Historical Provider, MD   hydrocortisone 2.5 % cream Apply topically 3 times daily  5/26/21  Yes Historical Provider, MD   aspirin 81 MG EC tablet Take 1 tablet by mouth daily   Yes Historical Provider, MD   pantoprazole (PROTONIX) 40 MG tablet Take 1 tablet by mouth daily   Yes Historical Provider, MD   traMADol (ULTRAM) 50 MG tablet Take 1 tablet by mouth every 6 hours as needed for Pain.    Yes Historical Provider, MD     Allergies:  Benzoyl peroxide    Social History     Socioeconomic History

## 2023-09-05 ENCOUNTER — HOSPITAL ENCOUNTER (OUTPATIENT)
Dept: INFUSION THERAPY | Age: 63
Discharge: HOME | End: 2023-09-05
Payer: MEDICARE

## 2023-09-05 ENCOUNTER — OFFICE VISIT (OUTPATIENT)
Dept: ONCOLOGY | Age: 63
End: 2023-09-05
Payer: MEDICARE

## 2023-09-05 VITALS
WEIGHT: 183 LBS | HEIGHT: 69 IN | DIASTOLIC BLOOD PRESSURE: 86 MMHG | BODY MASS INDEX: 27.11 KG/M2 | OXYGEN SATURATION: 97 % | HEART RATE: 83 BPM | SYSTOLIC BLOOD PRESSURE: 160 MMHG | TEMPERATURE: 97.6 F

## 2023-09-05 VITALS
DIASTOLIC BLOOD PRESSURE: 85 MMHG | HEART RATE: 76 BPM | SYSTOLIC BLOOD PRESSURE: 134 MMHG | RESPIRATION RATE: 16 BRPM | OXYGEN SATURATION: 98 % | TEMPERATURE: 98.2 F

## 2023-09-05 DIAGNOSIS — D75.1 ERYTHROCYTOSIS: Primary | ICD-10-CM

## 2023-09-05 LAB
BASOPHILS # BLD: 0.08 K/UL (ref 0–0.2)
BASOPHILS NFR BLD: 1 % (ref 0–2)
EOSINOPHIL # BLD: 0.14 K/UL (ref 0.05–0.5)
EOSINOPHILS RELATIVE PERCENT: 2 % (ref 0–6)
ERYTHROCYTE [DISTWIDTH] IN BLOOD BY AUTOMATED COUNT: 12.8 % (ref 11.5–15)
HCT VFR BLD AUTO: 54.9 % (ref 37–54)
HGB BLD-MCNC: 18.8 G/DL (ref 12.5–16.5)
IMM GRANULOCYTES # BLD AUTO: <0.03 K/UL (ref 0–0.58)
IMM GRANULOCYTES NFR BLD: 0 % (ref 0–5)
LYMPHOCYTES NFR BLD: 1.59 K/UL (ref 1.5–4)
LYMPHOCYTES RELATIVE PERCENT: 28 % (ref 20–42)
MCH RBC QN AUTO: 31.8 PG (ref 26–35)
MCHC RBC AUTO-ENTMCNC: 34.2 G/DL (ref 32–34.5)
MCV RBC AUTO: 92.9 FL (ref 80–99.9)
MONOCYTES NFR BLD: 0.57 K/UL (ref 0.1–0.95)
MONOCYTES NFR BLD: 10 % (ref 2–12)
NEUTROPHILS NFR BLD: 59 % (ref 43–80)
NEUTS SEG NFR BLD: 3.4 K/UL (ref 1.8–7.3)
PLATELET # BLD AUTO: 184 K/UL (ref 130–450)
PMV BLD AUTO: 9.4 FL (ref 7–12)
RBC # BLD AUTO: 5.91 M/UL (ref 3.8–5.8)
WBC OTHER # BLD: 5.8 K/UL (ref 4.5–11.5)

## 2023-09-05 PROCEDURE — 36415 COLL VENOUS BLD VENIPUNCTURE: CPT

## 2023-09-05 PROCEDURE — 3017F COLORECTAL CA SCREEN DOC REV: CPT | Performed by: INTERNAL MEDICINE

## 2023-09-05 PROCEDURE — 99195 PHLEBOTOMY: CPT

## 2023-09-05 PROCEDURE — 4004F PT TOBACCO SCREEN RCVD TLK: CPT | Performed by: INTERNAL MEDICINE

## 2023-09-05 PROCEDURE — G8417 CALC BMI ABV UP PARAM F/U: HCPCS | Performed by: INTERNAL MEDICINE

## 2023-09-05 PROCEDURE — 85025 COMPLETE CBC W/AUTO DIFF WBC: CPT

## 2023-09-05 PROCEDURE — 99214 OFFICE O/P EST MOD 30 MIN: CPT | Performed by: INTERNAL MEDICINE

## 2023-09-05 PROCEDURE — G8427 DOCREV CUR MEDS BY ELIG CLIN: HCPCS | Performed by: INTERNAL MEDICINE

## 2023-09-05 RX ORDER — 0.9 % SODIUM CHLORIDE 0.9 %
500 INTRAVENOUS SOLUTION INTRAVENOUS ONCE
Status: CANCELLED | OUTPATIENT
Start: 2023-09-05 | End: 2023-09-05

## 2023-09-05 RX ORDER — 0.9 % SODIUM CHLORIDE 0.9 %
250 INTRAVENOUS SOLUTION INTRAVENOUS ONCE
Status: CANCELLED | OUTPATIENT
Start: 2023-09-05 | End: 2023-09-05

## 2023-09-05 RX ORDER — 0.9 % SODIUM CHLORIDE 0.9 %
500 INTRAVENOUS SOLUTION INTRAVENOUS ONCE
Status: DISCONTINUED | OUTPATIENT
Start: 2023-09-05 | End: 2023-09-06 | Stop reason: HOSPADM

## 2023-09-05 RX ORDER — 0.9 % SODIUM CHLORIDE 0.9 %
250 INTRAVENOUS SOLUTION INTRAVENOUS ONCE
OUTPATIENT
Start: 2023-09-05 | End: 2023-09-05

## 2023-09-05 ASSESSMENT — PAIN SCALES - GENERAL: PAINLEVEL_OUTOF10: 3

## 2023-09-05 ASSESSMENT — PAIN DESCRIPTION - ONSET: ONSET: ON-GOING

## 2023-09-05 ASSESSMENT — PAIN DESCRIPTION - LOCATION: LOCATION: GENERALIZED

## 2023-09-05 NOTE — PROGRESS NOTES
Pt tolerated therapeutic phlebotomy without complaints. VS stable. Pt refused to have 500cc IV replacement per order. Educated on importance of fluid replacement, pt verbalized understanding and agreeable to 240cc of H20. Discharged ambulatory by self. Denies vertigo.

## 2023-09-05 NOTE — PROGRESS NOTES
Patient refused printed AVS.   Pt verbalized understanding of follow up plan of care. All questions answered.
HEMATOLOGY/MEDICAL [facility]  [address] [facility] MED ONCOLOGY  [address] [facility] [address] [address] [facility] [address]  Dept: [address] [place] [address] Avenue: [phone]

## 2023-11-21 ENCOUNTER — TELEPHONE (OUTPATIENT)
Dept: ORTHOPEDIC SURGERY | Age: 63
End: 2023-11-21

## 2023-11-21 NOTE — TELEPHONE ENCOUNTER
Patient is to be referred to Dr. Krystin Garcia    Referral reason: Chronic pain of left knee     Routed to [place].

## 2023-11-29 ENCOUNTER — OFFICE VISIT (OUTPATIENT)
Dept: ORTHOPEDIC SURGERY | Age: 63
End: 2023-11-29
Payer: MEDICARE

## 2023-11-29 VITALS — WEIGHT: 183 LBS | HEIGHT: 69 IN | BODY MASS INDEX: 27.11 KG/M2

## 2023-11-29 DIAGNOSIS — M17.12 PRIMARY OSTEOARTHRITIS OF LEFT KNEE: ICD-10-CM

## 2023-11-29 DIAGNOSIS — M25.562 LEFT KNEE PAIN, UNSPECIFIED CHRONICITY: Primary | ICD-10-CM

## 2023-11-29 PROCEDURE — G8417 CALC BMI ABV UP PARAM F/U: HCPCS | Performed by: FAMILY MEDICINE

## 2023-11-29 PROCEDURE — G8484 FLU IMMUNIZE NO ADMIN: HCPCS | Performed by: FAMILY MEDICINE

## 2023-11-29 PROCEDURE — 3017F COLORECTAL CA SCREEN DOC REV: CPT | Performed by: FAMILY MEDICINE

## 2023-11-29 PROCEDURE — 99204 OFFICE O/P NEW MOD 45 MIN: CPT | Performed by: FAMILY MEDICINE

## 2023-11-29 PROCEDURE — G8427 DOCREV CUR MEDS BY ELIG CLIN: HCPCS | Performed by: FAMILY MEDICINE

## 2023-11-29 PROCEDURE — 4004F PT TOBACCO SCREEN RCVD TLK: CPT | Performed by: FAMILY MEDICINE

## 2023-11-29 PROCEDURE — 20610 DRAIN/INJ JOINT/BURSA W/O US: CPT | Performed by: FAMILY MEDICINE

## 2023-11-29 RX ORDER — LIDOCAINE HYDROCHLORIDE 10 MG/ML
3 INJECTION, SOLUTION INFILTRATION; PERINEURAL ONCE
Status: COMPLETED | OUTPATIENT
Start: 2023-11-29 | End: 2023-11-29

## 2023-11-29 RX ORDER — TRIAMCINOLONE ACETONIDE 40 MG/ML
40 INJECTION, SUSPENSION INTRA-ARTICULAR; INTRAMUSCULAR ONCE
Status: COMPLETED | OUTPATIENT
Start: 2023-11-29 | End: 2023-11-29

## 2023-11-29 RX ADMIN — LIDOCAINE HYDROCHLORIDE 3 ML: 10 INJECTION, SOLUTION INFILTRATION; PERINEURAL at 16:18

## 2023-11-29 RX ADMIN — TRIAMCINOLONE ACETONIDE 40 MG: 40 INJECTION, SUSPENSION INTRA-ARTICULAR; INTRAMUSCULAR at 16:18

## 2023-12-06 DIAGNOSIS — D75.1 ERYTHROCYTOSIS: Primary | ICD-10-CM

## 2023-12-12 ENCOUNTER — HOSPITAL ENCOUNTER (OUTPATIENT)
Dept: INFUSION THERAPY | Age: 63
Discharge: HOME | End: 2023-12-12
Payer: MEDICARE

## 2023-12-12 ENCOUNTER — OFFICE VISIT (OUTPATIENT)
Dept: ONCOLOGY | Age: 63
End: 2023-12-12
Payer: MEDICARE

## 2023-12-12 VITALS
TEMPERATURE: 97.3 F | SYSTOLIC BLOOD PRESSURE: 143 MMHG | DIASTOLIC BLOOD PRESSURE: 92 MMHG | OXYGEN SATURATION: 97 % | HEART RATE: 89 BPM | RESPIRATION RATE: 18 BRPM

## 2023-12-12 VITALS
WEIGHT: 185.3 LBS | HEIGHT: 69 IN | BODY MASS INDEX: 27.44 KG/M2 | OXYGEN SATURATION: 98 % | HEART RATE: 90 BPM | DIASTOLIC BLOOD PRESSURE: 89 MMHG | TEMPERATURE: 97.4 F | SYSTOLIC BLOOD PRESSURE: 165 MMHG

## 2023-12-12 DIAGNOSIS — D75.1 ERYTHROCYTOSIS: Primary | ICD-10-CM

## 2023-12-12 DIAGNOSIS — Z87.891 PERSONAL HISTORY OF TOBACCO USE: ICD-10-CM

## 2023-12-12 DIAGNOSIS — F17.200 TOBACCO USE DISORDER: Primary | ICD-10-CM

## 2023-12-12 PROCEDURE — 4004F PT TOBACCO SCREEN RCVD TLK: CPT | Performed by: INTERNAL MEDICINE

## 2023-12-12 PROCEDURE — 99214 OFFICE O/P EST MOD 30 MIN: CPT | Performed by: INTERNAL MEDICINE

## 2023-12-12 PROCEDURE — G8427 DOCREV CUR MEDS BY ELIG CLIN: HCPCS | Performed by: INTERNAL MEDICINE

## 2023-12-12 PROCEDURE — G0296 VISIT TO DETERM LDCT ELIG: HCPCS | Performed by: INTERNAL MEDICINE

## 2023-12-12 PROCEDURE — 3017F COLORECTAL CA SCREEN DOC REV: CPT | Performed by: INTERNAL MEDICINE

## 2023-12-12 PROCEDURE — G8484 FLU IMMUNIZE NO ADMIN: HCPCS | Performed by: INTERNAL MEDICINE

## 2023-12-12 PROCEDURE — 99195 PHLEBOTOMY: CPT

## 2023-12-12 PROCEDURE — G8417 CALC BMI ABV UP PARAM F/U: HCPCS | Performed by: INTERNAL MEDICINE

## 2023-12-12 RX ORDER — 0.9 % SODIUM CHLORIDE 0.9 %
250 INTRAVENOUS SOLUTION INTRAVENOUS ONCE
Status: DISCONTINUED | OUTPATIENT
Start: 2023-12-12 | End: 2023-12-13 | Stop reason: HOSPADM

## 2023-12-12 RX ORDER — 0.9 % SODIUM CHLORIDE 0.9 %
250 INTRAVENOUS SOLUTION INTRAVENOUS ONCE
OUTPATIENT
Start: 2023-12-12 | End: 2023-12-12

## 2023-12-12 NOTE — PROGRESS NOTES
[facility]  PRIMARY CARE SPORTS MEDICINE  DATE OF VISIT : 2023    Patient : Chucky Chowdhury  Age : 66 y.o.  : 1960  MRN : [id number]   ______________________________________________________________________    Chief Complaint :   Chief Complaint   Patient presents with    Knee Pain     Left for a couple months    had an injection in the left once and didn't like it 2011ish had rt knee partial       HPI : Chucky Chowdhury is 66 y.o. male who presented to the clinic today for evaluation of left knee pain. Onset of the symptoms was several years ago, with no known mechanism of injury. Current symptoms include pain and swelling. Patient denies mechanical symptoms. Pain is aggravated by any weight bearing especially deep squats. Evaluation to date: XRs of the left knee which demonstrate no acute fractures or dislocations but significant degenerative change most severe in the medial compartment. Treatment to date: avoidance of offending activity and OTC analgesics which are not very effective. Past Medical History :  Past Medical History:   Diagnosis Date    GERD (gastroesophageal reflux disease)     Hypertension     Osteoarthritis     Varicose veins of legs      Past Surgical History:   Procedure Laterality Date    APPENDECTOMY      TOTAL KNEE ARTHROPLASTY Right 2018       Allergies : Allergies   Allergen Reactions    Benzoyl Peroxide Hives       Medication List :    Current Outpatient Medications   Medication Sig Dispense Refill    Elastic Bandages & Supports (JOBST KNEE HIGH COMPRESSION SM) MISC Dx: Varicose veins of the legs with pain and swelling. Bilateral knee-high 20-30 mm Hg compression stockings.  2 each 2    fluticasone (FLONASE) 50 MCG/ACT nasal spray 1 spray by Each Nostril route daily      lisinopril (PRINIVIL;ZESTRIL) 10 MG tablet take 1 tablet by mouth once daily      hydrocortisone 2.5 % cream Apply topically 3 times daily       aspirin 81 MG EC tablet Take 1 tablet by

## 2023-12-12 NOTE — PROGRESS NOTES
[address] [place] [address] Drive  [address] [facility] MED ONCOLOGY  [address] [facility] [address]  Dept: [address]: [phone]  Attending progress note      Reason for Visit:   Polycythemia. Referring Physician: Gonzalez Poe MD.    PCP:  Nishant Garcia DO    History of Present Illness: The patient is a very pleasant 68-year-old gentleman with a past medical history significant for tobacco use disorder, COPD, hypertension, osteoarthritis and GERD, who has been followed at the [facility] in [facility] for erythrocytosis. The patient had had chronic erythrocytosis, he had a bone marrow biopsy and aspirate done on 7/5/2012, was negative for a primary bone marrow disorder, the erythrocytosis was attributed to lung disease and tobacco abuse, he has been having therapeutic phlebotomies about once a month, for hematocrit greater than 42%. Last phlebotomy was about 2 weeks prior to transferring his care to [facility] ([facility]). No night sweats, unexplained weight loss. No new problems, unfortunately he continues to smoke cigarettes although trying to cut down on smoking. The patient is following with his PCP, working on weight loss for BS control. Review of Systems;  CONSTITUTIONAL: No fever, chills. Good appetite and energy level, positive for intentional weight loss. ENMT: Eyes: No diplopia, no blurriness; Nose: No epistaxis. Mouth: No sore throat. RESPIRATORY: No hemoptysis, positive for chronic shortness of breath, nonproductive cough. CARDIOVASCULAR: No chest pain, palpitations. GASTROINTESTINAL: No nausea/vomiting, abdominal pain, diarrhea/constipation. GENITOURINARY: No dysuria, urinary frequency, hematuria. NEURO: No syncope, presyncope, sometimes he has headaches.   Remainder:  ROS NEGATIVE    Past Medical History:      Diagnosis Date    GERD (gastroesophageal reflux disease)     Hypertension     Osteoarthritis     Varicose veins of legs      Patient

## 2024-03-08 ENCOUNTER — HOSPITAL ENCOUNTER (OUTPATIENT)
Dept: CT IMAGING | Age: 64
End: 2024-03-08
Attending: INTERNAL MEDICINE
Payer: MEDICARE

## 2024-03-08 DIAGNOSIS — F17.200 TOBACCO USE DISORDER: ICD-10-CM

## 2024-03-08 PROCEDURE — 71271 CT THORAX LUNG CANCER SCR C-: CPT

## 2024-03-11 ENCOUNTER — TELEPHONE (OUTPATIENT)
Dept: CASE MANAGEMENT | Age: 64
End: 2024-03-11

## 2024-03-11 NOTE — TELEPHONE ENCOUNTER
No call, encounter opened to process CT Lung Screening.    CT Lung Screen: 3/8/2024    IMPRESSION:  1. There is no pulmonary infiltrate, mass or suspicious pulmonary nodule  2. Emphysematous changes     LUNG RADS:  Lung-RADS 1 - Negative (v2022)     Management:  12 month screening LDCT     RECOMMENDATIONS:  If you would like to register your patient with the [facility] Lung Nodule/Lung  Cancer Screening Program, please contact the Nurse Navigator at  [phone].    Pack years: 20    Social History     Tobacco Use  Smoking Status: Current Every Day Smoker    Start Date:    Quit Date:    Types: Cigarettes   Packs/Day: 0.5   Years: 40   Pack Years: 20   Smokeless Tobacco: Never         Results letter sent to patient via my chart or mailed.     Mariel Monk  Imaging Navigator   [facility]   [phone]

## 2024-03-12 ENCOUNTER — HOSPITAL ENCOUNTER (OUTPATIENT)
Dept: INFUSION THERAPY | Age: 64
Discharge: HOME | End: 2024-03-12
Payer: MEDICARE

## 2024-03-12 ENCOUNTER — OFFICE VISIT (OUTPATIENT)
Dept: ONCOLOGY | Age: 64
End: 2024-03-12
Payer: MEDICARE

## 2024-03-12 VITALS
DIASTOLIC BLOOD PRESSURE: 85 MMHG | RESPIRATION RATE: 18 BRPM | TEMPERATURE: 98.2 F | SYSTOLIC BLOOD PRESSURE: 144 MMHG | HEART RATE: 82 BPM

## 2024-03-12 VITALS
TEMPERATURE: 97.7 F | DIASTOLIC BLOOD PRESSURE: 83 MMHG | HEART RATE: 89 BPM | SYSTOLIC BLOOD PRESSURE: 145 MMHG | BODY MASS INDEX: 27.91 KG/M2 | OXYGEN SATURATION: 97 % | HEIGHT: 69 IN | WEIGHT: 188.4 LBS

## 2024-03-12 DIAGNOSIS — F17.200 TOBACCO USE DISORDER: Primary | ICD-10-CM

## 2024-03-12 DIAGNOSIS — D75.1 ERYTHROCYTOSIS: ICD-10-CM

## 2024-03-12 DIAGNOSIS — D75.1 ERYTHROCYTOSIS: Primary | ICD-10-CM

## 2024-03-12 LAB
ALBUMIN SERPL-MCNC: 4.9 G/DL (ref 3.5–5.2)
ALP SERPL-CCNC: 78 U/L (ref 40–129)
ALT SERPL-CCNC: 25 U/L (ref 0–40)
ANION GAP SERPL CALCULATED.3IONS-SCNC: 11 MMOL/L (ref 7–16)
AST SERPL-CCNC: 22 U/L (ref 0–39)
BASOPHILS # BLD: 0.07 K/UL (ref 0–0.2)
BASOPHILS NFR BLD: 1 % (ref 0–2)
BILIRUB SERPL-MCNC: 0.7 MG/DL (ref 0–1.2)
BUN SERPL-MCNC: 14 MG/DL (ref 6–23)
CALCIUM SERPL-MCNC: 9.9 MG/DL (ref 8.6–10.2)
CHLORIDE SERPL-SCNC: 97 MMOL/L (ref 98–107)
CO2 SERPL-SCNC: 28 MMOL/L (ref 22–29)
CREAT SERPL-MCNC: 0.9 MG/DL (ref 0.7–1.2)
EOSINOPHIL # BLD: 0.14 K/UL (ref 0.05–0.5)
EOSINOPHILS RELATIVE PERCENT: 2 % (ref 0–6)
ERYTHROCYTE [DISTWIDTH] IN BLOOD BY AUTOMATED COUNT: 12.6 % (ref 11.5–15)
GFR SERPL CREATININE-BSD FRML MDRD: >60 ML/MIN/1.73M2
GLUCOSE SERPL-MCNC: 154 MG/DL (ref 74–99)
HCT VFR BLD AUTO: 55.7 % (ref 37–54)
HGB BLD-MCNC: 19.1 G/DL (ref 12.5–16.5)
IMM GRANULOCYTES # BLD AUTO: <0.03 K/UL (ref 0–0.58)
IMM GRANULOCYTES NFR BLD: 0 % (ref 0–5)
LYMPHOCYTES NFR BLD: 1.74 K/UL (ref 1.5–4)
LYMPHOCYTES RELATIVE PERCENT: 27 % (ref 20–42)
MCH RBC QN AUTO: 32.4 PG (ref 26–35)
MCHC RBC AUTO-ENTMCNC: 34.3 G/DL (ref 32–34.5)
MCV RBC AUTO: 94.6 FL (ref 80–99.9)
MONOCYTES NFR BLD: 0.67 K/UL (ref 0.1–0.95)
MONOCYTES NFR BLD: 10 % (ref 2–12)
NEUTROPHILS NFR BLD: 59 % (ref 43–80)
NEUTS SEG NFR BLD: 3.85 K/UL (ref 1.8–7.3)
PLATELET # BLD AUTO: 177 K/UL (ref 130–450)
PMV BLD AUTO: 9.3 FL (ref 7–12)
POTASSIUM SERPL-SCNC: 4.4 MMOL/L (ref 3.5–5)
PROT SERPL-MCNC: 7.9 G/DL (ref 6.4–8.3)
RBC # BLD AUTO: 5.89 M/UL (ref 3.8–5.8)
SODIUM SERPL-SCNC: 136 MMOL/L (ref 132–146)
WBC OTHER # BLD: 6.5 K/UL (ref 4.5–11.5)

## 2024-03-12 PROCEDURE — G8484 FLU IMMUNIZE NO ADMIN: HCPCS | Performed by: INTERNAL MEDICINE

## 2024-03-12 PROCEDURE — 99195 PHLEBOTOMY: CPT

## 2024-03-12 PROCEDURE — 99214 OFFICE O/P EST MOD 30 MIN: CPT | Performed by: INTERNAL MEDICINE

## 2024-03-12 PROCEDURE — 85025 COMPLETE CBC W/AUTO DIFF WBC: CPT

## 2024-03-12 PROCEDURE — 4004F PT TOBACCO SCREEN RCVD TLK: CPT | Performed by: INTERNAL MEDICINE

## 2024-03-12 PROCEDURE — G8427 DOCREV CUR MEDS BY ELIG CLIN: HCPCS | Performed by: INTERNAL MEDICINE

## 2024-03-12 PROCEDURE — 3017F COLORECTAL CA SCREEN DOC REV: CPT | Performed by: INTERNAL MEDICINE

## 2024-03-12 PROCEDURE — 36415 COLL VENOUS BLD VENIPUNCTURE: CPT

## 2024-03-12 PROCEDURE — G8417 CALC BMI ABV UP PARAM F/U: HCPCS | Performed by: INTERNAL MEDICINE

## 2024-03-12 PROCEDURE — 80053 COMPREHEN METABOLIC PANEL: CPT

## 2024-03-12 RX ORDER — 0.9 % SODIUM CHLORIDE 0.9 %
250 INTRAVENOUS SOLUTION INTRAVENOUS ONCE
Status: CANCELLED | OUTPATIENT
Start: 2024-03-12 | End: 2024-03-12

## 2024-03-12 RX ORDER — 0.9 % SODIUM CHLORIDE 0.9 %
250 INTRAVENOUS SOLUTION INTRAVENOUS ONCE
OUTPATIENT
Start: 2024-03-12 | End: 2024-03-12

## 2024-03-12 RX ORDER — 0.9 % SODIUM CHLORIDE 0.9 %
250 INTRAVENOUS SOLUTION INTRAVENOUS ONCE
Status: DISCONTINUED | OUTPATIENT
Start: 2024-03-12 | End: 2024-03-13 | Stop reason: HOSPADM

## 2024-03-12 NOTE — PROGRESS NOTES
[facility] PHYSICIANS [facility] MED ONCOLOGY  [address]  [facility] [address]  Dept: [phone]  Loc: [phone]  Attending progress note      Reason for Visit:   Polycythemia.    Referring Physician: David Huffman MD.    PCP:  Clyde Hurst DO    History of Present Illness:     The patient is a very pleasant 62-year-old gentleman with a past medical history significant for tobacco use disorder, COPD, hypertension, osteoarthritis and GERD, who has been followed at the [facility] [place] [facility] for erythrocytosis. The patient had had chronic erythrocytosis, he had a bone marrow biopsy and aspirate done on 7/5/2012, was negative for a primary bone marrow disorder, the erythrocytosis was attributed to lung disease and tobacco abuse, he has been having therapeutic phlebotomies about once a month, for hematocrit greater than 42%.  Last phlebotomy was about 2 weeks prior to transferring his care to [facility].    The patient returns for follow-up visit, he has knee pain, unfortunately continues to smoke cigarettes, although trying to cut down.    Review of Systems;  CONSTITUTIONAL: No fever, chills. Good appetite and energy level, positive for intentional weight loss.  ENMT: Eyes: No diplopia, no blurriness; Nose: No epistaxis. Mouth: No sore throat.  RESPIRATORY: No hemoptysis, positive for chronic shortness of breath, nonproductive cough.   CARDIOVASCULAR: No chest pain, palpitations.  GASTROINTESTINAL: No nausea/vomiting, abdominal pain, diarrhea/constipation.  GENITOURINARY: No dysuria, urinary frequency, hematuria.  NEURO: No syncope, presyncope, sometimes he has headaches.  Remainder:  ROS NEGATIVE    Past Medical History:      Diagnosis Date    GERD (gastroesophageal reflux disease)     Hypertension     Osteoarthritis     Varicose veins of legs      Patient Active Problem List   Diagnosis    Erythrocytosis        Past Surgical History:      Procedure

## 2024-03-12 NOTE — PROGRESS NOTES
IV site initiated at left ac with 20# intima. Therapeutic phlebotomy performed for 350 cc blood. Tolerated procedure and 15 minute observation without adverse reaction. Took fluids freely throughout observation.     Vitals: see flow sheet     Verbal information provided to patient / family on procedure and post procedure care. Encouraged to call clinic during clinic hours and physician after clinic hours if questions or concerns arise.

## 2024-06-20 DIAGNOSIS — D75.1 ERYTHROCYTOSIS: Primary | ICD-10-CM

## 2024-06-25 ENCOUNTER — HOSPITAL ENCOUNTER (OUTPATIENT)
Dept: INFUSION THERAPY | Age: 64
Discharge: HOME | End: 2024-06-25
Payer: MEDICARE

## 2024-06-25 ENCOUNTER — OFFICE VISIT (OUTPATIENT)
Dept: ONCOLOGY | Age: 64
End: 2024-06-25
Payer: MEDICARE

## 2024-06-25 VITALS
WEIGHT: 185 LBS | DIASTOLIC BLOOD PRESSURE: 80 MMHG | TEMPERATURE: 98.1 F | SYSTOLIC BLOOD PRESSURE: 178 MMHG | HEART RATE: 81 BPM | HEIGHT: 69 IN | OXYGEN SATURATION: 98 % | BODY MASS INDEX: 27.4 KG/M2

## 2024-06-25 VITALS
SYSTOLIC BLOOD PRESSURE: 144 MMHG | OXYGEN SATURATION: 95 % | RESPIRATION RATE: 16 BRPM | TEMPERATURE: 98.2 F | DIASTOLIC BLOOD PRESSURE: 82 MMHG | HEART RATE: 77 BPM

## 2024-06-25 DIAGNOSIS — D75.1 ERYTHROCYTOSIS: Primary | ICD-10-CM

## 2024-06-25 LAB
ALBUMIN SERPL-MCNC: 4.5 G/DL (ref 3.5–5.2)
ALP SERPL-CCNC: 71 U/L (ref 40–129)
ALT SERPL-CCNC: 24 U/L (ref 0–40)
ANION GAP SERPL CALCULATED.3IONS-SCNC: 9 MMOL/L (ref 7–16)
AST SERPL-CCNC: 24 U/L (ref 0–39)
BASOPHILS # BLD: 0.07 K/UL (ref 0–0.2)
BASOPHILS NFR BLD: 1 % (ref 0–2)
BILIRUB SERPL-MCNC: 0.6 MG/DL (ref 0–1.2)
BUN SERPL-MCNC: 13 MG/DL (ref 6–23)
CALCIUM SERPL-MCNC: 9.8 MG/DL (ref 8.6–10.2)
CHLORIDE SERPL-SCNC: 98 MMOL/L (ref 98–107)
CO2 SERPL-SCNC: 29 MMOL/L (ref 22–29)
CREAT SERPL-MCNC: 0.8 MG/DL (ref 0.7–1.2)
EOSINOPHIL # BLD: 0.1 K/UL (ref 0.05–0.5)
EOSINOPHILS RELATIVE PERCENT: 2 % (ref 0–6)
ERYTHROCYTE [DISTWIDTH] IN BLOOD BY AUTOMATED COUNT: 13.2 % (ref 11.5–15)
GFR, ESTIMATED: >90 ML/MIN/1.73M2
GLUCOSE SERPL-MCNC: 152 MG/DL (ref 74–99)
HCT VFR BLD AUTO: 52.1 % (ref 37–54)
HGB BLD-MCNC: 18.3 G/DL (ref 12.5–16.5)
IMM GRANULOCYTES # BLD AUTO: <0.03 K/UL (ref 0–0.58)
IMM GRANULOCYTES NFR BLD: 0 % (ref 0–5)
LYMPHOCYTES NFR BLD: 1.3 K/UL (ref 1.5–4)
LYMPHOCYTES RELATIVE PERCENT: 19 % (ref 20–42)
MCH RBC QN AUTO: 33.1 PG (ref 26–35)
MCHC RBC AUTO-ENTMCNC: 35.1 G/DL (ref 32–34.5)
MCV RBC AUTO: 94.2 FL (ref 80–99.9)
MONOCYTES NFR BLD: 0.74 K/UL (ref 0.1–0.95)
MONOCYTES NFR BLD: 11 % (ref 2–12)
NEUTROPHILS NFR BLD: 67 % (ref 43–80)
NEUTS SEG NFR BLD: 4.61 K/UL (ref 1.8–7.3)
PLATELET # BLD AUTO: 149 K/UL (ref 130–450)
PMV BLD AUTO: 10.1 FL (ref 7–12)
POTASSIUM SERPL-SCNC: 4.3 MMOL/L (ref 3.5–5)
PROT SERPL-MCNC: 7.7 G/DL (ref 6.4–8.3)
RBC # BLD AUTO: 5.53 M/UL (ref 3.8–5.8)
SODIUM SERPL-SCNC: 136 MMOL/L (ref 132–146)
WBC OTHER # BLD: 6.8 K/UL (ref 4.5–11.5)

## 2024-06-25 PROCEDURE — 99214 OFFICE O/P EST MOD 30 MIN: CPT | Performed by: INTERNAL MEDICINE

## 2024-06-25 PROCEDURE — 3017F COLORECTAL CA SCREEN DOC REV: CPT | Performed by: INTERNAL MEDICINE

## 2024-06-25 PROCEDURE — G8417 CALC BMI ABV UP PARAM F/U: HCPCS | Performed by: INTERNAL MEDICINE

## 2024-06-25 PROCEDURE — G8427 DOCREV CUR MEDS BY ELIG CLIN: HCPCS | Performed by: INTERNAL MEDICINE

## 2024-06-25 PROCEDURE — 80053 COMPREHEN METABOLIC PANEL: CPT

## 2024-06-25 PROCEDURE — 99195 PHLEBOTOMY: CPT

## 2024-06-25 PROCEDURE — 4004F PT TOBACCO SCREEN RCVD TLK: CPT | Performed by: INTERNAL MEDICINE

## 2024-06-25 PROCEDURE — 85025 COMPLETE CBC W/AUTO DIFF WBC: CPT

## 2024-06-25 PROCEDURE — 36415 COLL VENOUS BLD VENIPUNCTURE: CPT

## 2024-06-25 RX ORDER — 0.9 % SODIUM CHLORIDE 0.9 %
250 INTRAVENOUS SOLUTION INTRAVENOUS ONCE
Status: CANCELLED | OUTPATIENT
Start: 2024-06-25 | End: 2024-06-25

## 2024-06-25 RX ORDER — 0.9 % SODIUM CHLORIDE 0.9 %
250 INTRAVENOUS SOLUTION INTRAVENOUS ONCE
OUTPATIENT
Start: 2024-06-25 | End: 2024-06-25

## 2024-06-25 RX ORDER — 0.9 % SODIUM CHLORIDE 0.9 %
250 INTRAVENOUS SOLUTION INTRAVENOUS ONCE
Status: DISCONTINUED | OUTPATIENT
Start: 2024-06-25 | End: 2024-06-26 | Stop reason: HOSPADM

## 2024-06-25 ASSESSMENT — PAIN DESCRIPTION - ORIENTATION: ORIENTATION: RIGHT;LEFT

## 2024-06-25 ASSESSMENT — PAIN SCALES - GENERAL: PAINLEVEL_OUTOF10: 5

## 2024-06-25 ASSESSMENT — PAIN DESCRIPTION - FREQUENCY: FREQUENCY: CONTINUOUS

## 2024-06-25 ASSESSMENT — PAIN DESCRIPTION - PAIN TYPE: TYPE: CHRONIC PAIN

## 2024-06-25 ASSESSMENT — PAIN DESCRIPTION - ONSET: ONSET: ON-GOING

## 2024-06-25 ASSESSMENT — PAIN DESCRIPTION - LOCATION: LOCATION: KNEE

## 2024-06-25 ASSESSMENT — PAIN DESCRIPTION - DESCRIPTORS: DESCRIPTORS: ACHING

## 2024-06-25 ASSESSMENT — PAIN - FUNCTIONAL ASSESSMENT: PAIN_FUNCTIONAL_ASSESSMENT: ACTIVITIES ARE NOT PREVENTED

## 2024-06-25 NOTE — PROGRESS NOTES
Tolerated phlebotomy well refused IV replacement-had 300cc oral liquids. Vitals stable discharged.IV site initiated at LAC with 20# intima. Therapeutic phlebotomy performed for 350 cc blood. Tolerated procedure and 15 minute observation without adverse reaction. Took fluids freely throughout observation.     Vitals: BP (!) 144/82   Pulse 77   Temp 98.2 °F (36.8 °C)   Resp 16   SpO2 95%        Verbal information provided to patient / family on procedure and post procedure care. Encouraged to call clinic during clinic hours and physician after clinic hours if questions or concerns arise.

## 2024-06-25 NOTE — PROGRESS NOTES
[facility] PHYSICIANS [facility] MED ONCOLOGY  [address]  [facility] [address]  Dept: [phone]  Loc: [phone]  Attending progress note      Reason for Visit:   Polycythemia.    Referring Physician: David Huffman MD.    PCP:  Clyde Hurst DO    History of Present Illness:     The patient is a very pleasant 64-year-old gentleman with a past medical history significant for tobacco use disorder, COPD, hypertension, osteoarthritis and GERD, who has been followed at the [facility] oncology [place] in [facility] for erythrocytosis. The patient had had chronic erythrocytosis, he had a bone marrow biopsy and aspirate done on 7/5/2012, was negative for a primary bone marrow disorder, the erythrocytosis was attributed to lung disease and tobacco abuse, he has been having therapeutic phlebotomies about once a month, for hematocrit greater than 42%.  Last phlebotomy was about 2 weeks prior to transferring his care to [facility].    The patient returns for follow-up visit, he denies any worsening shortness of breath, unfortunately continues to smoke cigarettes.  No issues with the last phlebotomy.    Review of Systems;  CONSTITUTIONAL: No fever, chills. Good appetite and energy level, positive for intentional weight loss.  ENMT: Eyes: No diplopia, no blurriness; Nose: No epistaxis. Mouth: No sore throat.  RESPIRATORY: No hemoptysis, positive for chronic shortness of breath, nonproductive cough.   CARDIOVASCULAR: No chest pain, palpitations.  GASTROINTESTINAL: No nausea/vomiting, abdominal pain, diarrhea/constipation.  GENITOURINARY: No dysuria, urinary frequency, hematuria.  NEURO: No syncope, presyncope, sometimes he has headaches.  Remainder:  ROS NEGATIVE    Past Medical History:      Diagnosis Date    GERD (gastroesophageal reflux disease)     Hypertension     Osteoarthritis     Varicose veins of legs      Patient Active Problem List   Diagnosis    Erythrocytosis

## 2024-09-24 ENCOUNTER — HOSPITAL ENCOUNTER (OUTPATIENT)
Dept: INFUSION THERAPY | Age: 64
Discharge: HOME | End: 2024-09-24
Payer: MEDICARE

## 2024-09-24 ENCOUNTER — OFFICE VISIT (OUTPATIENT)
Dept: ONCOLOGY | Age: 64
End: 2024-09-24
Payer: MEDICARE

## 2024-09-24 VITALS
OXYGEN SATURATION: 97 % | BODY MASS INDEX: 28.05 KG/M2 | TEMPERATURE: 97.5 F | HEIGHT: 69 IN | DIASTOLIC BLOOD PRESSURE: 92 MMHG | HEART RATE: 84 BPM | SYSTOLIC BLOOD PRESSURE: 157 MMHG | WEIGHT: 189.4 LBS

## 2024-09-24 VITALS
HEART RATE: 84 BPM | SYSTOLIC BLOOD PRESSURE: 137 MMHG | RESPIRATION RATE: 16 BRPM | TEMPERATURE: 97.5 F | DIASTOLIC BLOOD PRESSURE: 84 MMHG

## 2024-09-24 DIAGNOSIS — D75.1 ERYTHROCYTOSIS: ICD-10-CM

## 2024-09-24 DIAGNOSIS — D75.1 ERYTHROCYTOSIS: Primary | ICD-10-CM

## 2024-09-24 DIAGNOSIS — F17.200 TOBACCO USE DISORDER: ICD-10-CM

## 2024-09-24 LAB
ALBUMIN SERPL-MCNC: 4.5 G/DL (ref 3.5–5.2)
ALP SERPL-CCNC: 74 U/L (ref 40–129)
ALT SERPL-CCNC: 26 U/L (ref 0–40)
ANION GAP SERPL CALCULATED.3IONS-SCNC: 11 MMOL/L (ref 7–16)
AST SERPL-CCNC: 32 U/L (ref 0–39)
BASOPHILS # BLD: 0.07 K/UL (ref 0–0.2)
BASOPHILS NFR BLD: 1 % (ref 0–2)
BILIRUB SERPL-MCNC: 0.4 MG/DL (ref 0–1.2)
BUN SERPL-MCNC: 13 MG/DL (ref 6–23)
CALCIUM SERPL-MCNC: 9.1 MG/DL (ref 8.6–10.2)
CHLORIDE SERPL-SCNC: 98 MMOL/L (ref 98–107)
CO2 SERPL-SCNC: 29 MMOL/L (ref 22–29)
CREAT SERPL-MCNC: 0.7 MG/DL (ref 0.7–1.2)
EOSINOPHIL # BLD: 0.17 K/UL (ref 0.05–0.5)
EOSINOPHILS RELATIVE PERCENT: 3 % (ref 0–6)
ERYTHROCYTE [DISTWIDTH] IN BLOOD BY AUTOMATED COUNT: 13 % (ref 11.5–15)
GFR, ESTIMATED: >90 ML/MIN/1.73M2
GLUCOSE SERPL-MCNC: 167 MG/DL (ref 74–99)
HCT VFR BLD AUTO: 52.4 % (ref 37–54)
HGB BLD-MCNC: 18.1 G/DL (ref 12.5–16.5)
IMM GRANULOCYTES # BLD AUTO: <0.03 K/UL (ref 0–0.58)
IMM GRANULOCYTES NFR BLD: 0 % (ref 0–5)
LYMPHOCYTES NFR BLD: 1.5 K/UL (ref 1.5–4)
LYMPHOCYTES RELATIVE PERCENT: 28 % (ref 20–42)
MCH RBC QN AUTO: 32.6 PG (ref 26–35)
MCHC RBC AUTO-ENTMCNC: 34.5 G/DL (ref 32–34.5)
MCV RBC AUTO: 94.2 FL (ref 80–99.9)
MONOCYTES NFR BLD: 0.54 K/UL (ref 0.1–0.95)
MONOCYTES NFR BLD: 10 % (ref 2–12)
NEUTROPHILS NFR BLD: 57 % (ref 43–80)
NEUTS SEG NFR BLD: 3.1 K/UL (ref 1.8–7.3)
PLATELET # BLD AUTO: 151 K/UL (ref 130–450)
PMV BLD AUTO: 9.1 FL (ref 7–12)
POTASSIUM SERPL-SCNC: 5.4 MMOL/L (ref 3.5–5)
PROT SERPL-MCNC: 7.6 G/DL (ref 6.4–8.3)
RBC # BLD AUTO: 5.56 M/UL (ref 3.8–5.8)
SODIUM SERPL-SCNC: 138 MMOL/L (ref 132–146)
WBC OTHER # BLD: 5.4 K/UL (ref 4.5–11.5)

## 2024-09-24 PROCEDURE — 36415 COLL VENOUS BLD VENIPUNCTURE: CPT

## 2024-09-24 PROCEDURE — G8427 DOCREV CUR MEDS BY ELIG CLIN: HCPCS | Performed by: INTERNAL MEDICINE

## 2024-09-24 PROCEDURE — G8417 CALC BMI ABV UP PARAM F/U: HCPCS | Performed by: INTERNAL MEDICINE

## 2024-09-24 PROCEDURE — 3017F COLORECTAL CA SCREEN DOC REV: CPT | Performed by: INTERNAL MEDICINE

## 2024-09-24 PROCEDURE — 4004F PT TOBACCO SCREEN RCVD TLK: CPT | Performed by: INTERNAL MEDICINE

## 2024-09-24 PROCEDURE — 99214 OFFICE O/P EST MOD 30 MIN: CPT | Performed by: INTERNAL MEDICINE

## 2024-09-24 PROCEDURE — 99195 PHLEBOTOMY: CPT

## 2024-09-24 PROCEDURE — 85025 COMPLETE CBC W/AUTO DIFF WBC: CPT

## 2024-09-24 PROCEDURE — 80053 COMPREHEN METABOLIC PANEL: CPT

## 2024-09-24 RX ORDER — 0.9 % SODIUM CHLORIDE 0.9 %
250 INTRAVENOUS SOLUTION INTRAVENOUS ONCE
OUTPATIENT
Start: 2024-09-24 | End: 2024-09-24

## 2024-09-24 NOTE — PROGRESS NOTES
Therapeutic phlebotomy 350cc out tolerated well. Vitals stable. Pt had 200cc of apple juice prior to discharge.Discharged ambulatory home.

## 2024-12-09 DIAGNOSIS — D75.1 ERYTHROCYTOSIS: Primary | ICD-10-CM

## 2024-12-17 ENCOUNTER — HOSPITAL ENCOUNTER (OUTPATIENT)
Dept: INFUSION THERAPY | Age: 64
Discharge: HOME | End: 2024-12-17
Payer: MEDICARE

## 2024-12-17 ENCOUNTER — OFFICE VISIT (OUTPATIENT)
Dept: ONCOLOGY | Age: 64
End: 2024-12-17

## 2024-12-17 VITALS
HEIGHT: 69 IN | OXYGEN SATURATION: 97 % | WEIGHT: 192.7 LBS | DIASTOLIC BLOOD PRESSURE: 84 MMHG | HEART RATE: 87 BPM | TEMPERATURE: 99.4 F | SYSTOLIC BLOOD PRESSURE: 161 MMHG | BODY MASS INDEX: 28.54 KG/M2

## 2024-12-17 VITALS
HEART RATE: 91 BPM | SYSTOLIC BLOOD PRESSURE: 142 MMHG | DIASTOLIC BLOOD PRESSURE: 84 MMHG | TEMPERATURE: 98.1 F | RESPIRATION RATE: 16 BRPM

## 2024-12-17 DIAGNOSIS — Z87.891 PERSONAL HISTORY OF TOBACCO USE: ICD-10-CM

## 2024-12-17 DIAGNOSIS — F17.200 TOBACCO USE DISORDER: ICD-10-CM

## 2024-12-17 DIAGNOSIS — D75.1 ERYTHROCYTOSIS: Primary | ICD-10-CM

## 2024-12-17 LAB
ALBUMIN SERPL-MCNC: 4.3 G/DL (ref 3.5–5.2)
ALP SERPL-CCNC: 69 U/L (ref 40–129)
ALT SERPL-CCNC: 24 U/L (ref 0–40)
ANION GAP SERPL CALCULATED.3IONS-SCNC: 9 MMOL/L (ref 7–16)
AST SERPL-CCNC: 24 U/L (ref 0–39)
BASOPHILS # BLD: 0.08 K/UL (ref 0–0.2)
BASOPHILS NFR BLD: 1 % (ref 0–2)
BILIRUB SERPL-MCNC: 0.6 MG/DL (ref 0–1.2)
BUN SERPL-MCNC: 12 MG/DL (ref 6–23)
CALCIUM SERPL-MCNC: 9.7 MG/DL (ref 8.6–10.2)
CHLORIDE SERPL-SCNC: 100 MMOL/L (ref 98–107)
CO2 SERPL-SCNC: 29 MMOL/L (ref 22–29)
CREAT SERPL-MCNC: 0.8 MG/DL (ref 0.7–1.2)
EOSINOPHIL # BLD: 0.16 K/UL (ref 0.05–0.5)
EOSINOPHILS RELATIVE PERCENT: 3 % (ref 0–6)
ERYTHROCYTE [DISTWIDTH] IN BLOOD BY AUTOMATED COUNT: 12.9 % (ref 11.5–15)
GFR, ESTIMATED: >90 ML/MIN/1.73M2
GLUCOSE SERPL-MCNC: 174 MG/DL (ref 74–99)
HCT VFR BLD AUTO: 52.5 % (ref 37–54)
HGB BLD-MCNC: 18.3 G/DL (ref 12.5–16.5)
IMM GRANULOCYTES # BLD AUTO: <0.03 K/UL (ref 0–0.58)
IMM GRANULOCYTES NFR BLD: 0 % (ref 0–5)
LYMPHOCYTES NFR BLD: 1.63 K/UL (ref 1.5–4)
LYMPHOCYTES RELATIVE PERCENT: 26 % (ref 20–42)
MCH RBC QN AUTO: 32.5 PG (ref 26–35)
MCHC RBC AUTO-ENTMCNC: 34.9 G/DL (ref 32–34.5)
MCV RBC AUTO: 93.3 FL (ref 80–99.9)
MONOCYTES NFR BLD: 0.59 K/UL (ref 0.1–0.95)
MONOCYTES NFR BLD: 9 % (ref 2–12)
NEUTROPHILS NFR BLD: 61 % (ref 43–80)
NEUTS SEG NFR BLD: 3.86 K/UL (ref 1.8–7.3)
PLATELET # BLD AUTO: 166 K/UL (ref 130–450)
PMV BLD AUTO: 9.6 FL (ref 7–12)
POTASSIUM SERPL-SCNC: 5.2 MMOL/L (ref 3.5–5)
PROT SERPL-MCNC: 7.2 G/DL (ref 6.4–8.3)
RBC # BLD AUTO: 5.63 M/UL (ref 3.8–5.8)
SODIUM SERPL-SCNC: 138 MMOL/L (ref 132–146)
WBC OTHER # BLD: 6.3 K/UL (ref 4.5–11.5)

## 2024-12-17 PROCEDURE — 36415 COLL VENOUS BLD VENIPUNCTURE: CPT

## 2024-12-17 PROCEDURE — 85025 COMPLETE CBC W/AUTO DIFF WBC: CPT

## 2024-12-17 PROCEDURE — 80053 COMPREHEN METABOLIC PANEL: CPT

## 2024-12-17 RX ORDER — 0.9 % SODIUM CHLORIDE 0.9 %
250 INTRAVENOUS SOLUTION INTRAVENOUS ONCE
OUTPATIENT
Start: 2024-12-17 | End: 2024-12-17

## 2024-12-17 RX ORDER — 0.9 % SODIUM CHLORIDE 0.9 %
250 INTRAVENOUS SOLUTION INTRAVENOUS ONCE
Status: DISCONTINUED | OUTPATIENT
Start: 2024-12-17 | End: 2024-12-18 | Stop reason: HOSPADM

## 2024-12-17 ASSESSMENT — PAIN DESCRIPTION - ONSET: ONSET: ON-GOING

## 2024-12-17 ASSESSMENT — PAIN DESCRIPTION - LOCATION: LOCATION: BACK;LEG

## 2024-12-17 ASSESSMENT — PAIN SCALES - GENERAL: PAINLEVEL_OUTOF10: 5

## 2024-12-17 ASSESSMENT — PAIN - FUNCTIONAL ASSESSMENT: PAIN_FUNCTIONAL_ASSESSMENT: ACTIVITIES ARE NOT PREVENTED

## 2024-12-17 ASSESSMENT — PAIN DESCRIPTION - PAIN TYPE: TYPE: CHRONIC PAIN

## 2024-12-17 ASSESSMENT — PAIN DESCRIPTION - DESCRIPTORS: DESCRIPTORS: ACHING;DISCOMFORT

## 2024-12-17 ASSESSMENT — PAIN DESCRIPTION - ORIENTATION: ORIENTATION: RIGHT;LEFT

## 2024-12-17 ASSESSMENT — PAIN DESCRIPTION - FREQUENCY: FREQUENCY: CONTINUOUS

## 2024-12-17 ASSESSMENT — PAIN DESCRIPTION - DIRECTION: RADIATING_TOWARDS: NONRADIATING

## 2024-12-17 NOTE — PROGRESS NOTES
Patient tolerated therapeutic phlebotomy well. 350cc blood removed. Remained on unit for 15min after treatment. Patient alert and oriented x3. No distress noted. Vital signs stable. Patient denies any new or worsening pain. Educated patient on possible side effects and treatment of medication.     Patient verbalized understanding. Offered patient education and/or discharge material. Patient declined. Patient denies any needs. All questions answered. D/C in stable condition.

## 2025-03-13 DIAGNOSIS — D75.1 ERYTHROCYTOSIS: Primary | ICD-10-CM

## 2025-03-18 ENCOUNTER — HOSPITAL ENCOUNTER (OUTPATIENT)
Dept: INFUSION THERAPY | Age: 65
Discharge: HOME | End: 2025-03-18
Payer: MEDICARE

## 2025-03-18 ENCOUNTER — OFFICE VISIT (OUTPATIENT)
Dept: ONCOLOGY | Age: 65
End: 2025-03-18

## 2025-03-18 VITALS
SYSTOLIC BLOOD PRESSURE: 151 MMHG | TEMPERATURE: 98 F | RESPIRATION RATE: 20 BRPM | DIASTOLIC BLOOD PRESSURE: 84 MMHG | HEART RATE: 78 BPM

## 2025-03-18 VITALS
WEIGHT: 190.7 LBS | OXYGEN SATURATION: 97 % | HEIGHT: 69 IN | DIASTOLIC BLOOD PRESSURE: 90 MMHG | HEART RATE: 81 BPM | TEMPERATURE: 98.2 F | SYSTOLIC BLOOD PRESSURE: 163 MMHG | BODY MASS INDEX: 28.24 KG/M2

## 2025-03-18 DIAGNOSIS — D75.1 ERYTHROCYTOSIS: Primary | ICD-10-CM

## 2025-03-18 DIAGNOSIS — Z87.891 PERSONAL HISTORY OF TOBACCO USE: ICD-10-CM

## 2025-03-18 DIAGNOSIS — F17.200 TOBACCO USE DISORDER: ICD-10-CM

## 2025-03-18 LAB
BASOPHILS # BLD: 0.07 K/UL (ref 0–0.2)
BASOPHILS NFR BLD: 1 % (ref 0–2)
EOSINOPHIL # BLD: 0.14 K/UL (ref 0.05–0.5)
EOSINOPHILS RELATIVE PERCENT: 3 % (ref 0–6)
ERYTHROCYTE [DISTWIDTH] IN BLOOD BY AUTOMATED COUNT: 12.7 % (ref 11.5–15)
HCT VFR BLD AUTO: 52.5 % (ref 37–54)
HGB BLD-MCNC: 17.9 G/DL (ref 12.5–16.5)
IMM GRANULOCYTES # BLD AUTO: <0.03 K/UL (ref 0–0.58)
IMM GRANULOCYTES NFR BLD: 0 % (ref 0–5)
LYMPHOCYTES NFR BLD: 1.37 K/UL (ref 1.5–4)
LYMPHOCYTES RELATIVE PERCENT: 27 % (ref 20–42)
MCH RBC QN AUTO: 31.8 PG (ref 26–35)
MCHC RBC AUTO-ENTMCNC: 34.1 G/DL (ref 32–34.5)
MCV RBC AUTO: 93.3 FL (ref 80–99.9)
MONOCYTES NFR BLD: 0.55 K/UL (ref 0.1–0.95)
MONOCYTES NFR BLD: 11 % (ref 2–12)
NEUTROPHILS NFR BLD: 59 % (ref 43–80)
NEUTS SEG NFR BLD: 3.03 K/UL (ref 1.8–7.3)
PLATELET # BLD AUTO: 171 K/UL (ref 130–450)
PMV BLD AUTO: 9.4 FL (ref 7–12)
RBC # BLD AUTO: 5.63 M/UL (ref 3.8–5.8)
WBC OTHER # BLD: 5.2 K/UL (ref 4.5–11.5)

## 2025-03-18 PROCEDURE — 36415 COLL VENOUS BLD VENIPUNCTURE: CPT

## 2025-03-18 PROCEDURE — 99195 PHLEBOTOMY: CPT

## 2025-03-18 PROCEDURE — 85025 COMPLETE CBC W/AUTO DIFF WBC: CPT

## 2025-03-18 RX ORDER — 0.9 % SODIUM CHLORIDE 0.9 %
250 INTRAVENOUS SOLUTION INTRAVENOUS ONCE
OUTPATIENT
Start: 2025-03-18 | End: 2025-03-18

## 2025-03-18 RX ORDER — 0.9 % SODIUM CHLORIDE 0.9 %
250 INTRAVENOUS SOLUTION INTRAVENOUS ONCE
Status: DISCONTINUED | OUTPATIENT
Start: 2025-03-18 | End: 2025-03-19 | Stop reason: HOSPADM

## 2025-03-18 NOTE — PROGRESS NOTES
Patient tolerated therapeutic phlebotomy well. 350cc blood removed. Remained on unit for 15min after treatment. Patient alert and oriented x3. No distress noted. Vital signs stable. Did not meet requirements for fluid replacement per parameters. Patient denies any new or worsening pain. Educated patient on possible side effects and treatment of medication.     Patient verbalized understanding. Offered patient education and/or discharge material. Patient declined. Patient denies any needs. All questions answered. D/C in stable condition.

## 2025-03-18 NOTE — PROGRESS NOTES
Patient provided with discharge instructions, received printed AVS.  All questions answered.  Patient understands follow up plan of care.     
lung screen was ordered to be done this month, but has not been scheduled, discussed with the staff, CT to be scheduled for    I counseled the patient on smoking cessation.    RTC in 3 months, with labs, possible phleb.    Thank you for allowing us to participate in the care of Mrs. Mayo.    MITCH GALLEGOS MD   HEMATOLOGY/MEDICAL ONCOLOGY  [facility] MED [facility]COL[facility]  [address] [place] [id number]  Dept: [phone]  Loc: [phone]

## 2025-06-17 ENCOUNTER — HOSPITAL ENCOUNTER (OUTPATIENT)
Dept: INFUSION THERAPY | Age: 65
Discharge: HOME | End: 2025-06-17
Payer: MEDICARE

## 2025-06-17 ENCOUNTER — OFFICE VISIT (OUTPATIENT)
Age: 65
End: 2025-06-17
Payer: MEDICARE

## 2025-06-17 VITALS
DIASTOLIC BLOOD PRESSURE: 86 MMHG | OXYGEN SATURATION: 96 % | SYSTOLIC BLOOD PRESSURE: 146 MMHG | TEMPERATURE: 98.2 F | HEART RATE: 84 BPM

## 2025-06-17 VITALS
TEMPERATURE: 97.9 F | OXYGEN SATURATION: 97 % | DIASTOLIC BLOOD PRESSURE: 90 MMHG | WEIGHT: 187.8 LBS | SYSTOLIC BLOOD PRESSURE: 159 MMHG | HEIGHT: 69 IN | HEART RATE: 85 BPM | BODY MASS INDEX: 27.81 KG/M2

## 2025-06-17 DIAGNOSIS — F17.200 TOBACCO USE DISORDER: ICD-10-CM

## 2025-06-17 DIAGNOSIS — Z87.891 PERSONAL HISTORY OF TOBACCO USE: ICD-10-CM

## 2025-06-17 DIAGNOSIS — D75.1 ERYTHROCYTOSIS: Primary | ICD-10-CM

## 2025-06-17 DIAGNOSIS — D75.1 ERYTHROCYTOSIS: ICD-10-CM

## 2025-06-17 LAB
ALBUMIN SERPL-MCNC: 4.4 G/DL (ref 3.5–5.2)
ALP SERPL-CCNC: 81 U/L (ref 40–129)
ALT SERPL-CCNC: 18 U/L (ref 0–50)
ANION GAP SERPL CALCULATED.3IONS-SCNC: 11 MMOL/L (ref 7–16)
AST SERPL-CCNC: 22 U/L (ref 0–50)
BASOPHILS # BLD: 0.07 K/UL (ref 0–0.2)
BASOPHILS NFR BLD: 1 % (ref 0–2)
BILIRUB SERPL-MCNC: 0.6 MG/DL (ref 0–1.2)
BUN SERPL-MCNC: 14 MG/DL (ref 8–23)
CALCIUM SERPL-MCNC: 9.6 MG/DL (ref 8.8–10.2)
CHLORIDE SERPL-SCNC: 97 MMOL/L (ref 98–107)
CO2 SERPL-SCNC: 25 MMOL/L (ref 22–29)
CREAT SERPL-MCNC: 0.8 MG/DL (ref 0.7–1.2)
EOSINOPHIL # BLD: 0.09 K/UL (ref 0.05–0.5)
EOSINOPHILS RELATIVE PERCENT: 2 % (ref 0–6)
ERYTHROCYTE [DISTWIDTH] IN BLOOD BY AUTOMATED COUNT: 13.7 % (ref 11.5–15)
GFR, ESTIMATED: >90 ML/MIN/1.73M2
GLUCOSE SERPL-MCNC: 220 MG/DL (ref 74–99)
HCT VFR BLD AUTO: 52.7 % (ref 37–54)
HGB BLD-MCNC: 18.4 G/DL (ref 12.5–16.5)
IMM GRANULOCYTES # BLD AUTO: <0.03 K/UL (ref 0–0.58)
IMM GRANULOCYTES NFR BLD: 0 % (ref 0–5)
LYMPHOCYTES NFR BLD: 1.3 K/UL (ref 1.5–4)
LYMPHOCYTES RELATIVE PERCENT: 24 % (ref 20–42)
MCH RBC QN AUTO: 31.6 PG (ref 26–35)
MCHC RBC AUTO-ENTMCNC: 34.9 G/DL (ref 32–34.5)
MCV RBC AUTO: 90.5 FL (ref 80–99.9)
MONOCYTES NFR BLD: 0.44 K/UL (ref 0.1–0.95)
MONOCYTES NFR BLD: 8 % (ref 2–12)
NEUTROPHILS NFR BLD: 64 % (ref 43–80)
NEUTS SEG NFR BLD: 3.48 K/UL (ref 1.8–7.3)
PLATELET # BLD AUTO: 168 K/UL (ref 130–450)
PMV BLD AUTO: 9.1 FL (ref 7–12)
POTASSIUM SERPL-SCNC: 5.1 MMOL/L (ref 3.5–5.1)
PROT SERPL-MCNC: 7.4 G/DL (ref 6.4–8.3)
RBC # BLD AUTO: 5.82 M/UL (ref 3.8–5.8)
SODIUM SERPL-SCNC: 134 MMOL/L (ref 136–145)
WBC OTHER # BLD: 5.4 K/UL (ref 4.5–11.5)

## 2025-06-17 PROCEDURE — 99195 PHLEBOTOMY: CPT

## 2025-06-17 PROCEDURE — 3017F COLORECTAL CA SCREEN DOC REV: CPT | Performed by: INTERNAL MEDICINE

## 2025-06-17 PROCEDURE — 80053 COMPREHEN METABOLIC PANEL: CPT

## 2025-06-17 PROCEDURE — G8417 CALC BMI ABV UP PARAM F/U: HCPCS | Performed by: INTERNAL MEDICINE

## 2025-06-17 PROCEDURE — 36415 COLL VENOUS BLD VENIPUNCTURE: CPT

## 2025-06-17 PROCEDURE — 99214 OFFICE O/P EST MOD 30 MIN: CPT | Performed by: INTERNAL MEDICINE

## 2025-06-17 PROCEDURE — 85025 COMPLETE CBC W/AUTO DIFF WBC: CPT

## 2025-06-17 PROCEDURE — 4004F PT TOBACCO SCREEN RCVD TLK: CPT | Performed by: INTERNAL MEDICINE

## 2025-06-17 PROCEDURE — G8427 DOCREV CUR MEDS BY ELIG CLIN: HCPCS | Performed by: INTERNAL MEDICINE

## 2025-06-17 RX ORDER — 0.9 % SODIUM CHLORIDE 0.9 %
250 INTRAVENOUS SOLUTION INTRAVENOUS ONCE
OUTPATIENT
Start: 2025-06-17 | End: 2025-06-17

## 2025-06-17 RX ORDER — 0.9 % SODIUM CHLORIDE 0.9 %
250 INTRAVENOUS SOLUTION INTRAVENOUS ONCE
Status: DISCONTINUED | OUTPATIENT
Start: 2025-06-17 | End: 2025-06-18 | Stop reason: HOSPADM

## 2025-06-17 NOTE — PROGRESS NOTES
Patient tolerated phlebotomy well. Patient alert and oriented x 3 No distress noted. Vital signs stable. Patient denies any new or worsening pain. Patient denies questions regarding treatment or medication; verbalized understanding, offered patient information and discharge material; patient declined; Patient denies needs, all questions answered. Left ambulatory buy self

## 2025-06-17 NOTE — PROGRESS NOTES
[facility] MED ONCOLOG[facility]  [address]  [facility] [address]  Dept: [phone]  Loc: [phone]  Attending progress note      Reason for Visit:   Polycythemia.    Referring Physician: David Huffman MD.    PCP:  Clyde Hurst DO    History of Present Illness:     The patient is a very pleasant 62-year-old gentleman with a past medical history significant for tobacco use disorder, COPD, hypertension, osteoarthritis and GERD, who has been followed at the [facility] in [facility] for erythrocytosis. The patient had had chronic erythrocytosis, he had a bone marrow biopsy and aspirate done on 7/5/2012, was negative for a primary bone marrow disorder, the erythrocytosis was attributed to lung disease and tobacco abuse, he has been having therapeutic phlebotomies about once a month, for hematocrit greater than 42%.  Last phlebotomy was about 2 weeks prior to transferring his care to [facility].    The patient returns for follow-up visit, he denies any worsening shortness of breath, unfortunately continues to smoke cigarettes.  No new problems    Review of Systems;  CONSTITUTIONAL: No fever, chills. Good appetite and energy level, positive for intentional weight loss.  ENMT: Eyes: No diplopia, no blurriness; Nose: No epistaxis. Mouth: No sore throat.  RESPIRATORY: No hemoptysis, positive for chronic shortness of breath, nonproductive cough.   CARDIOVASCULAR: No chest pain, palpitations.  GASTROINTESTINAL: No nausea/vomiting, abdominal pain, diarrhea/constipation.  GENITOURINARY: No dysuria, urinary frequency, hematuria.  NEURO: No syncope, presyncope, sometimes he has headaches.  Remainder:  ROS NEGATIVE    Past Medical History:      Diagnosis Date    GERD (gastroesophageal reflux disease)     Hypertension     Osteoarthritis     Varicose veins of legs      Patient Active Problem List   Diagnosis    Erythrocytosis        Past Surgical  Dupixent Pregnancy And Lactation Text: This medication likely crosses the placenta but the risk for the fetus is uncertain. This medication is excreted in breast milk.

## 2025-07-24 ENCOUNTER — HOSPITAL ENCOUNTER (OUTPATIENT)
Dept: CT IMAGING | Age: 65
Discharge: HOME | End: 2025-07-26
Attending: INTERNAL MEDICINE
Payer: MEDICARE

## 2025-07-24 DIAGNOSIS — Z87.891 PERSONAL HISTORY OF TOBACCO USE: ICD-10-CM

## 2025-07-24 PROCEDURE — 71271 CT THORAX LUNG CANCER SCR C-: CPT
